# Patient Record
Sex: MALE | Race: WHITE | NOT HISPANIC OR LATINO | Employment: FULL TIME | URBAN - METROPOLITAN AREA
[De-identification: names, ages, dates, MRNs, and addresses within clinical notes are randomized per-mention and may not be internally consistent; named-entity substitution may affect disease eponyms.]

---

## 2017-01-07 RX ORDER — TIOTROPIUM BROMIDE 18 UG/1
CAPSULE ORAL; RESPIRATORY (INHALATION)
Refills: 6 | Status: CANCELLED | OUTPATIENT
Start: 2017-01-07

## 2017-01-11 NOTE — TELEPHONE ENCOUNTER
----- Message from Lola Saucedo sent at 1/11/2017 12:07 PM CST -----  Contact: [facility]  [phone]  markie  -  Pharmacy called requesting a refill on  tiotropium (SPIRIVA  - call back number [phone]  Thanks,

## 2017-01-17 RX ORDER — TIOTROPIUM BROMIDE 18 UG/1
18 CAPSULE ORAL; RESPIRATORY (INHALATION) DAILY
Qty: 30 CAPSULE | Refills: 6 | Status: SHIPPED | OUTPATIENT
Start: 2017-01-17 | End: 2017-08-09 | Stop reason: SDUPTHER

## 2017-02-15 ENCOUNTER — OFFICE VISIT (OUTPATIENT)
Dept: FAMILY MEDICINE | Facility: CLINIC | Age: 62
End: 2017-02-15
Payer: COMMERCIAL

## 2017-02-15 VITALS
OXYGEN SATURATION: 96 % | HEIGHT: 66 IN | SYSTOLIC BLOOD PRESSURE: 104 MMHG | WEIGHT: 215.63 LBS | HEART RATE: 90 BPM | DIASTOLIC BLOOD PRESSURE: 62 MMHG | BODY MASS INDEX: 34.65 KG/M2

## 2017-02-15 DIAGNOSIS — J44.9 COPD SUGGESTED BY INITIAL EVALUATION: ICD-10-CM

## 2017-02-15 DIAGNOSIS — F11.21 OPIOID TYPE DEPENDENCE IN REMISSION: ICD-10-CM

## 2017-02-15 DIAGNOSIS — F11.20 OPIOID DEPENDENCE ON AGONIST THERAPY: Primary | ICD-10-CM

## 2017-02-15 DIAGNOSIS — I10 ESSENTIAL HYPERTENSION: ICD-10-CM

## 2017-02-15 DIAGNOSIS — G47.33 OSA (OBSTRUCTIVE SLEEP APNEA): ICD-10-CM

## 2017-02-15 PROCEDURE — 99214 OFFICE O/P EST MOD 30 MIN: CPT | Mod: S$GLB,,,

## 2017-02-15 PROCEDURE — 3074F SYST BP LT 130 MM HG: CPT | Mod: S$GLB,,,

## 2017-02-15 PROCEDURE — 99999 PR PBB SHADOW E&M-EST. PATIENT-LVL III: CPT | Mod: PBBFAC,,,

## 2017-02-15 PROCEDURE — 3078F DIAST BP <80 MM HG: CPT | Mod: S$GLB,,,

## 2017-02-15 RX ORDER — BETAMETHASONE VALERATE 0.1 %
LOTION (ML) TOPICAL 2 TIMES DAILY
Qty: 60 ML | Refills: 11 | Status: SHIPPED | OUTPATIENT
Start: 2017-02-15 | End: 2018-07-16

## 2017-02-15 RX ORDER — PSEUDOEPHEDRINE HCL 120 MG/1
120 TABLET, FILM COATED, EXTENDED RELEASE ORAL
COMMUNITY
End: 2018-07-16

## 2017-02-15 NOTE — PATIENT INSTRUCTIONS
"You are being prescribed Buprenorphine/Naloxone for opioid dependency. Buprenorphine is an opioid medication. Buprenorphine is similar to other opioids such as morphine, codeine, and heroin however, it produces less euphoric ("high") effects and therefore may be easier to stop taking.  Naloxone blocks the effects of opioids such as morphine, codeine, and heroin. If buprenorphine and naloxone is injected, naloxone will block the effects of buprenorphine and lead to withdrawal symptoms in a person with opioid dependency. When administered under the tongue as directed, naloxone will not affect the actions of buprenorphine.    Buprenorphine and naloxone can cause drug dependence. This means that withdrawal symptoms may occur if you stop using the medicine too quickly. Withdrawal symptoms may also occur at the start of treatment due to dependence on another drug. Buprenorphine and naloxone is not for occasional ("as needed") use. Do not stop taking buprenorphine and naloxone without first talking to your doctor. Your doctor may want to gradually reduce the dose to avoid or minimize withdrawal symptoms.    Buprenorphine/Naloxone can only be prescribed by certain physicians who have had training in the field of addictive disorders and are certified by the Drug Enforcement Agency (JOSE ELIAS). Buprenorphine/Naloxone is a controlled substance and it's use is regulated by the [place] Board of Medical Examiners, [place] [employer] board and the JOSE [employer].     It is important that you are compliant in taking Buprenorphine/Naloxone strictly as prescribed. You have signed an agreement and am aware of the the risk of physical dependency, tolerance or addition to Buprenorphine/Naloxone. You have been informed of the side effects associated with this medication including constipation. You have been informed that certain medications in the benzodiazepine class including diazepam, lorazepam, alprazolam and similar medications " "should not be taken with Buprenorphine/Naloxone. You are aware that withdrawal symptoms including yawning, sweating, watery eyes, runny nose, anxiety, tremors, aching muscles, hot and cold flashes, goose bumps, abdominal pain, diarrhea and depression can occur if you suddenly discontinue or reduce the dose you are prescribed.     You have been informed that this medication should not be taken during pregnancy and you will inform me if you do plan to become or become pregnant during treatment.     You have agreed not to obtain this or other controlled medications from other physicians while taking Buprenorphine/Naloxone. You understand that I will review a history of all controlled medications prescribed to you on the [facility] Prescription Monitoring Program Database and that you must report any other controlled medication use to your physician. You will not take opioid pain medications while taking Buprenorphine/Naloxone. If you have an emergency or acute pain crises you will inform me as soon as possible.     You will come for your regularly scheduled appointments which must be at least every 90 days. You may schedule an appointment early and I can prescribe your medication in advance with a "do not fill until" date. If you have not been evaluated in the past 90 days your prescription will not be renewed. You should schedule an appointment for your return visit before you leave the office today because if I am out of the office for vacation or other reasons there is no other physician that can prescribe this medication in my absence. If you schedule an appointment and I need to cancel that visit my office will contact you to make alternative arrangements.     You should not call for refills of this medications at night, weekends or Holidays without exception.     You have agreed not to use illegal drugs while taking Buprenorphine/Naloxone including marijuana. You understand that I can request a random " drug test at any time and that you have agreed to provide this sample without advanced notice. Failure to comply with this request could result in termination of your treatment with Buprenorphine/Naloxone.     You understand that lost, stolen, misplaced, spilled medications will not be replaced. It is your responsibility to safeguard your medication. You understand that your medication cannot be filled early for any reason including work schedule conflicts, family emergencies, travel and vacations. You also have agreed not to give or sell Buprenorphine/Naloxone to anyone.     You have agreed to take this medication under your tongue properly.     If your insurance requires a prior authorization for this medication it could result in a delay of authorization for payment of your medication. I advise that you obtain information from your insurance and pharmacist to determine if you will require a prior authorization as far in advance as possible.     It is strongly recommended that you seek outpatient substance abuse treatment in addition to medical management with Buprenorphine/Naloxone. If your insurance doesn't provide this benefit or if you can't afford counseling you can seek treatment with your local AA/NA chapter or with your [facility]. Many insurances require that you are actively in outpatient treatment before they will authorize payment for Buprenorphine/Naloxone.

## 2017-02-15 NOTE — MR AVS SNAPSHOT
[facility]  [address] [place] [address]  Phone: [phone]  Fax: [phone]                  Donald Sharma   2/15/2017 1:20 PM   Office Visit    Description:  Male : 1955   Provider:  Galileo Fontenot Jr., MD   Department:  [facility]           Reason for Visit     Drug / Alcohol Assessment           Diagnoses this Visit        Comments    Opioid dependence on agonist therapy    -  Primary     EMILIANO (obstructive sleep apnea)         COPD suggested by initial evaluation         Essential hypertension         Opioid type dependence in remission                To Do List           Future Appointments        Provider Department Dept Phone    2017 3:00 PM Oneal Sun MD [facility] - Pulmonary Services [phone]      Goals (5 Years of Data)     None      Follow-Up and Disposition     Return in about 3 months (around 5/15/2017).    Follow-up and Disposition History       These Medications        Disp Refills Start End    betamethasone valerate 0.1% (VALISONE) 0.1 % Lotn 60 mL 11 2/15/2017 2017    Apply topically 2 (two) times daily. - Topical    Pharmacy: [facility]/pharmacy #[id number] - [place], [place] - [address] Ph #: [phone]       buprenorphine-naloxone (ZUBSOLV) 5.7-1.4 mg Subl 30 tablet 2 2/15/2017     Place 1 tablet under the tongue once daily. - Sublingual    Pharmacy: [facility]/pharmacy #[id number] - [place], [place] - [address] Ph #: [phone]         [facility]     [facility] On Call Nurse Care Line -  Assistance  Registered nurses in the [facility] provide clinical advisement, health education, appointment booking, and other advisory services.  Call for this free service at [phone].             Medications           Message regarding Medications     Verify the changes and/or additions to your medication regime listed below are the same as discussed with your clinician today.  If any of these changes or  "additions are incorrect, please notify your healthcare provider.        START taking these NEW medications        Refills    betamethasone valerate 0.1% (VALISONE) 0.1 % Lotn 11    Sig: Apply topically 2 (two) times daily.    Class: Normal    Route: Topical           Verify that the below list of medications is an accurate representation of the medications you are currently taking.  If none reported, the list may be blank. If incorrect, please contact your healthcare provider. Carry this list with you in case of emergency.           Current Medications     albuterol (VENTOLIN HFA) 90 mcg/actuation inhaler Inhale 2 puffs into the lungs every 6 (six) hours.    buprenorphine-naloxone (ZUBSOLV) 5.7-1.4 mg Subl Place 1 tablet under the tongue once daily.    buprenorphine-naloxone (ZUBSOLV) 5.7-1.4 mg Subl Place 1 tablet under the tongue once daily.    lisinopril (PRINIVIL,ZESTRIL) 40 MG tablet TAKE 1 TABLET (40 MG TOTAL) BY MOUTH ONCE DAILY.    pseudoephedrine (SUDAFED) 120 mg 12 hr tablet Take 120 mg by mouth every 12 (twelve) hours.    tamsulosin (FLOMAX) 0.4 mg Cp24 Take 1 capsule (0.4 mg total) by mouth once daily.    tiotropium (SPIRIVA WITH HANDIHALER) 18 mcg inhalation capsule Inhale 1 capsule (18 mcg total) into the lungs once daily.    betamethasone valerate 0.1% (VALISONE) 0.1 % Lotn Apply topically 2 (two) times daily.    ipratropium (ATROVENT) 0.06 % nasal spray 2 sprays by Nasal route 4 (four) times daily.           Clinical Reference Information           Your Vitals Were     BP Pulse Height Weight SpO2 BMI    104/62 (BP Location: Left arm, Patient Position: Sitting, BP Method: Manual) 90 5' 6" (1.676 m) 97.8 kg (215 lb 9.8 oz) 96% 34.8 kg/m2      Blood Pressure          Most Recent Value    BP  104/62      Allergies as of 2/15/2017     No Known Drug Allergies      Immunizations Administered on Date of Encounter - 2/15/2017     None      Instructions    You are being prescribed Buprenorphine/Naloxone for " "opioid dependency. Buprenorphine is an opioid medication. Buprenorphine is similar to other opioids such as morphine, codeine, and heroin however, it produces less euphoric ("high") effects and therefore may be easier to stop taking.  Naloxone blocks the effects of opioids such as morphine, codeine, and heroin. If buprenorphine and naloxone is injected, naloxone will block the effects of buprenorphine and lead to withdrawal symptoms in a person with opioid dependency. When administered under the tongue as directed, naloxone will not affect the actions of buprenorphine.    Buprenorphine and naloxone can cause drug dependence. This means that withdrawal symptoms may occur if you stop using the medicine too quickly. Withdrawal symptoms may also occur at the start of treatment due to dependence on another drug. Buprenorphine and naloxone is not for occasional ("as needed") use. Do not stop taking buprenorphine and naloxone without first talking to your doctor. Your doctor may want to gradually reduce the dose to avoid or minimize withdrawal symptoms.    Buprenorphine/Naloxone can only be prescribed by certain physicians who have had training in the field of addictive disorders and are certified by the Drug Enforcement Agency (JOSE ELI[employer]). Buprenorphine/Naloxone is a controlled substance and it's use is regulated by the [facility] Board of Medical Examiners, [place] [facility] board and the [employer].     It is important that you are compliant in taking Buprenorphine/Naloxone strictly as prescribed. You have signed an agreement and am aware of the the risk of physical dependency, tolerance or addition to Buprenorphine/Naloxone. You have been informed of the side effects associated with this medication including constipation. You have been informed that certain medications in the benzodiazepine class including diazepam, lorazepam, alprazolam and similar medications should not be taken with Buprenorphine/Naloxone. You are " "aware that withdrawal symptoms including yawning, sweating, watery eyes, runny nose, anxiety, tremors, aching muscles, hot and cold flashes, goose bumps, abdominal pain, diarrhea and depression can occur if you suddenly discontinue or reduce the dose you are prescribed.     You have been informed that this medication should not be taken during pregnancy and you will inform me if you do plan to become or become pregnant during treatment.     You have agreed not to obtain this or other controlled medications from other physicians while taking Buprenorphine/Naloxone. You understand that I will review a history of all controlled medications prescribed to you on the [facility] Prescription Monitoring Program Database and that you must report any other controlled medication use to your physician. You will not take opioid pain medications while taking Buprenorphine/Naloxone. If you have an emergency or acute pain crises you will inform me as soon as possible.     You will come for your regularly scheduled appointments which must be at least every 90 days. You may schedule an appointment early and I can prescribe your medication in advance with a "do not fill until" date. If you have not been evaluated in the past 90 days your prescription will not be renewed. You should schedule an appointment for your return visit before you leave the office today because if I am out of the office for vacation or other reasons there is no other physician that can prescribe this medication in my absence. If you schedule an appointment and I need to cancel that visit my office will contact you to make alternative arrangements.     You should not call for refills of this medications at night, weekends or Holidays without exception.     You have agreed not to use illegal drugs while taking Buprenorphine/Naloxone including marijuana. You understand that I can request a random drug test at any time and that you have agreed to provide " this sample without advanced notice. Failure to comply with this request could result in termination of your treatment with Buprenorphine/Naloxone.     You understand that lost, stolen, misplaced, spilled medications will not be replaced. It is your responsibility to safeguard your medication. You understand that your medication cannot be filled early for any reason including work schedule conflicts, family emergencies, travel and vacations. You also have agreed not to give or sell Buprenorphine/Naloxone to anyone.     You have agreed to take this medication under your tongue properly.     If your insurance requires a prior authorization for this medication it could result in a delay of authorization for payment of your medication. I advise that you obtain information from your insurance and pharmacist to determine if you will require a prior authorization as far in advance as possible.     It is strongly recommended that you seek outpatient substance abuse treatment in addition to medical management with Buprenorphine/Naloxone. If your insurance doesn't provide this benefit or if you can't afford counseling you can seek treatment with your local AA/NA chapter or with your [facility]. Many insurances require that you are actively in outpatient treatment before they will authorize payment for Buprenorphine/Naloxone.        Smoking Cessation     If you would like to quit smoking:   You may be eligible for free services if you are a [place] resident and started smoking cigarettes before September 1, 1988.  Call the Smoking Cessation Trust (SCT) toll free at (058) 456-6753 or (425) 285-0237.   Call 6-331-QUIT-NOW if you do not meet the above criteria.            Language Assistance Services     ATTENTION: Language assistance services are available, free of charge. Please call [phone].      ATENCIÓN: Si habla español, tiene a sahu disposición servicios gratuitos de asistencia  lingüística. Enrique al [phone].     LUZ Ý: N?u b?n nói Ti?ng Vi?t, có các d?ch v? h? tr? ngôn ng? mi?n phí dành cho b?n. G?i s? [phone].         [facility] complies with applicable Federal civil rights laws and does not discriminate on the basis of race, color, national origin, age, disability, or sex.

## 2017-02-15 NOTE — PROGRESS NOTES
Subjective:       Patient ID: Donald Sharma is a 61 y.o. male.    Chief Complaint: Drug / Alcohol Assessment    HPI  The patient presents for medical management of opioid dependency. he is receiving maintenance therapy with Zubsolv. he is doing well and denies any craving or relapses. he denies any alcohol or substance abuse issues. he has had significant improvements in his relationships, social and occupational functioning. he claims to be compliant with treatment. I have reviewed his profile on the [place] board [place] Pharm[place] Prescription monitoring program website and he appears to be compliant. his last refill for Zubsolv 5.7 mg # 30 was on January 13, 2017. The patient understands the risks, benefits and alternatives associated with the medical management of opioid dependency with Suboxone and has signed a Behavior and Pain Agreement for the use of Controlled Drugs.        The patient presents for medical management of his chronic medical problems including EMILIANO, COPD and hypertension. He is compliant with his medications and CPAP. He is scheduled to consult with his pulmonologist. He is refusing to take an influenza vaccination.     Review of Systems   Constitutional: Negative.    Respiratory: Negative.  Negative for shortness of breath.    Cardiovascular: Negative for chest pain.   Skin: Positive for rash.        Itch skin on legs    Psychiatric/Behavioral: Negative.    All other systems reviewed and are negative.      Objective:      Vitals:    02/15/17 1318   BP: 104/62   Pulse: 90     Physical Exam   Constitutional: He is oriented to person, place, and time. He appears well-developed and well-nourished. He is cooperative. No distress.   HENT:   Head: Normocephalic and atraumatic.   Right Ear: Hearing, tympanic membrane, external ear and ear canal normal.   Left Ear: Hearing, external ear and ear canal normal.   Nose: Nose normal.   Mouth/Throat: Oropharynx is clear and moist.   Eyes:  Conjunctivae are normal. Pupils are equal, round, and reactive to light.   Neck: Normal range of motion. Neck supple. No thyromegaly present.   Cardiovascular: Normal rate, regular rhythm, normal heart sounds and intact distal pulses.    Pulmonary/Chest: Effort normal and breath sounds normal. No respiratory distress.   Musculoskeletal: Normal range of motion. He exhibits no edema or tenderness.   Lymphadenopathy:     He has no cervical adenopathy.   Neurological: He is alert and oriented to person, place, and time. He has normal strength. Coordination and gait normal.   Skin: Skin is warm, dry and intact. No cyanosis. Nails show no clubbing.   Psychiatric: He has a normal mood and affect. His speech is normal and behavior is normal. Judgment and thought content normal. Cognition and memory are normal.   Vitals reviewed.            Assessment:       1. Opioid dependence on agonist therapy    2. EMILIANO (obstructive sleep apnea)    3. COPD suggested by initial evaluation    4. Essential hypertension    5. Opioid type dependence in remission        Plan:       Opioid dependence on agonist therapy    EMILIANO (obstructive sleep apnea)    COPD suggested by initial evaluation    Essential hypertension    Opioid type dependence in remission  -     buprenorphine-naloxone (ZUBSOLV) 5.7-1.4 mg Subl; Place 1 tablet under the tongue once daily.  Dispense: 30 tablet; Refill: 2    Other orders  -     betamethasone valerate 0.1% (VALISONE) 0.1 % Lotn; Apply topically 2 (two) times daily.  Dispense: 60 mL; Refill: 11      Return in about 3 months (around 5/15/2017).

## 2017-02-17 ENCOUNTER — OFFICE VISIT (OUTPATIENT)
Dept: PULMONOLOGY | Facility: CLINIC | Age: 62
End: 2017-02-17
Payer: COMMERCIAL

## 2017-02-17 ENCOUNTER — HOSPITAL ENCOUNTER (OUTPATIENT)
Dept: RADIOLOGY | Facility: HOSPITAL | Age: 62
Discharge: HOME | End: 2017-02-17
Attending: INTERNAL MEDICINE

## 2017-02-17 VITALS
SYSTOLIC BLOOD PRESSURE: 118 MMHG | HEART RATE: 89 BPM | OXYGEN SATURATION: 96 % | HEIGHT: 67 IN | DIASTOLIC BLOOD PRESSURE: 76 MMHG | WEIGHT: 218.25 LBS | BODY MASS INDEX: 34.26 KG/M2

## 2017-02-17 DIAGNOSIS — G47.33 OSA (OBSTRUCTIVE SLEEP APNEA): ICD-10-CM

## 2017-02-17 DIAGNOSIS — J43.1 PANLOBULAR EMPHYSEMA: Primary | ICD-10-CM

## 2017-02-17 DIAGNOSIS — F17.200 TOBACCO DEPENDENCE SYNDROME: ICD-10-CM

## 2017-02-17 DIAGNOSIS — J43.1 PANLOBULAR EMPHYSEMA: ICD-10-CM

## 2017-02-17 PROCEDURE — 99999 PR PBB SHADOW E&M-EST. PATIENT-LVL IV: CPT | Mod: PBBFAC,,, | Performed by: INTERNAL MEDICINE

## 2017-02-17 PROCEDURE — 99214 OFFICE O/P EST MOD 30 MIN: CPT | Mod: S$GLB,,, | Performed by: INTERNAL MEDICINE

## 2017-02-17 PROCEDURE — 3074F SYST BP LT 130 MM HG: CPT | Mod: S$GLB,,, | Performed by: INTERNAL MEDICINE

## 2017-02-17 PROCEDURE — 1160F RVW MEDS BY RX/DR IN RCRD: CPT | Mod: S$GLB,,, | Performed by: INTERNAL MEDICINE

## 2017-02-17 PROCEDURE — 76497 UNLISTED CT PROCEDURE: CPT | Mod: TC

## 2017-02-17 PROCEDURE — 3078F DIAST BP <80 MM HG: CPT | Mod: S$GLB,,, | Performed by: INTERNAL MEDICINE

## 2017-02-17 NOTE — PROGRESS NOTES
Subjective:       Patient ID: Donald Sharma is a 61 y.o. male.    Chief Complaint: COPD    HPI Comments: Mr. Sharma is followed in pulmonary clinic for moderately severe COPD.  He has noted some improvement in his breathing since beginning Spiriva.  He uses albuterol 4-5 times per week and continues to work as a .  He has decreased his cigarette use to 1/2 pack per day.  He continues to use nicotine gum to help curb his cravings.  He has had no hospital admissions since our last visit.  Unfortunately, he did not get in to see Dr. Chao for CPAP titration.    Review of Systems   Constitutional: Negative for fever, chills, activity change and fatigue.   HENT: Negative for postnasal drip, sinus pressure and sore throat.    Eyes: Negative for redness and itching.   Respiratory: Negative for cough, sputum production, chest tightness, shortness of breath, wheezing and dyspnea on extertion.    Cardiovascular: Negative for chest pain, palpitations and leg swelling.   Musculoskeletal: Negative for arthralgias and myalgias.   Skin: Negative for rash.   Gastrointestinal: Negative for nausea, vomiting and abdominal pain.   Neurological: Negative for dizziness and headaches.   Psychiatric/Behavioral: Negative for confusion.       Objective:      Physical Exam   Constitutional: He is oriented to person, place, and time. He appears well-developed and well-nourished.   HENT:   Head: Normocephalic.   Nose: Nose normal.   Mouth/Throat: Oropharynx is clear and moist. Mallampati Score: III.   Neck: Normal range of motion. Neck supple.   Cardiovascular: Normal rate, regular rhythm, normal heart sounds and intact distal pulses.    Pulmonary/Chest: Normal expansion, symmetric chest wall expansion and breath sounds normal. No respiratory distress. He has no decreased breath sounds. He has no wheezes. He has no rhonchi. He has no rales.   Abdominal: Soft. Bowel sounds are normal.   Musculoskeletal: Normal range of  motion. He exhibits no edema.   Neurological: He is alert and oriented to person, place, and time. Gait normal.   Skin: Skin is warm. No rash noted.   Psychiatric: He has a normal mood and affect. His behavior is normal. Judgment and thought content normal.   Nursing note and vitals reviewed.    Personal Diagnostic Review  none pertinent  Pulmonary Function Tests 2/17/2017   SpO2 96   Height 67.000   Weight 3492.09   BMI (Calculated) 34.3         Assessment:       1. Panlobular emphysema    2. EMILIANO (obstructive sleep apnea)    3. Tobacco dependence syndrome        Outpatient Encounter Prescriptions as of 2/17/2017   Medication Sig Dispense Refill    albuterol (VENTOLIN HFA) 90 mcg/actuation inhaler Inhale 2 puffs into the lungs every 6 (six) hours. 1 Inhaler 11    betamethasone valerate 0.1% (VALISONE) 0.1 % Lotn Apply topically 2 (two) times daily. 60 mL 11    buprenorphine-naloxone (ZUBSOLV) 5.7-1.4 mg Subl Place 1 tablet under the tongue once daily. 30 tablet 2    lisinopril (PRINIVIL,ZESTRIL) 40 MG tablet TAKE 1 TABLET (40 MG TOTAL) BY MOUTH ONCE DAILY. 30 tablet 11    pseudoephedrine (SUDAFED) 120 mg 12 hr tablet Take 120 mg by mouth every 12 (twelve) hours.      tamsulosin (FLOMAX) 0.4 mg Cp24 Take 1 capsule (0.4 mg total) by mouth once daily. 30 capsule 11    tiotropium (SPIRIVA WITH HANDIHALER) 18 mcg inhalation capsule Inhale 1 capsule (18 mcg total) into the lungs once daily. 30 capsule 6    [DISCONTINUED] buprenorphine-naloxone (ZUBSOLV) 5.7-1.4 mg Subl Place 1 tablet under the tongue once daily. 30 tablet 2    [DISCONTINUED] ipratropium (ATROVENT) 0.06 % nasal spray 2 sprays by Nasal route 4 (four) times daily. 15 mL 12     No facility-administered encounter medications on file as of 2/17/2017.      Orders Placed This Encounter   Procedures    CT Chest Lung Screening Low Dose     Standing Status:   Future     Standing Expiration Date:   2/17/2018     Order Specific Question:   Are you a current  or former smoker who quit within the past 15 years?     Answer:   Yes     Order Specific Question:   Are you between age 55-77 years old?     Answer:   Yes     Order Specific Question:   Never had cancer or an abnormal Chest CT     Answer:   Yes     Order Specific Question:   May the Radiologist modify the order per protocol to meet the clinical needs of the patient?     Answer:   Yes    Ambulatory consult to Sleep Disorders     Referral Priority:   Routine     Referral Type:   Consultation     Referred to Provider:   Albaro Chao MD     Number of Visits Requested:   1     Plan:       1) Low-dose CT of chest to screen for lung cancer  2) Continue Spiriva     A) Albuterol as needed  3) Refer to Dr. Chao for CPAP titration study  4) Discussed use of nicotine replacement gum and use of electronic cigarettes as quitting strategies     A) Unable to use Chantix due to restrictions at work  5) Follow up 6-8 months    Oneal Sun MD  [facility] Pulmonary/CC Service, PGY VI  [phone]

## 2017-02-17 NOTE — PATIENT INSTRUCTIONS
1) Low-dose CT scan of chest to screen for lung cancer  2) Continue Spiriva     A) Albuterol as needed  3) Consider using electronic cigarettes instead of conventional cigarettes     A) Keep trying to stop smoking  4) Referral submitted to Dr. Chao for CPAP titration study  5) Return to clinic 6-8 months

## 2017-02-17 NOTE — MR AVS SNAPSHOT
[facility] - Pulmonary Services  [address]  [facility] [address]  Phone: [phone]                  Donald Sharma   2017 3:00 PM   Office Visit    Description:  Male : 1955   Provider:  Oneal Sun MD   Department:  [facility]           Reason for Visit     COPD           Diagnoses this Visit        Comments    Panlobular emphysema    -  Primary     EMILIANO (obstructive sleep apnea)         Tobacco dependence syndrome                To Do List           Goals (5 Years of Data)     None      Follow-Up and Disposition     Return in about 6 months (around 2017).      [facility]     [facility] -  Assistance  Registered nurses in the [facility] provide clinical advisement, health education, appointment booking, and other advisory services.  Call for this free service at [phone].             Medications           Message regarding Medications     Verify the changes and/or additions to your medication regime listed below are the same as discussed with your clinician today.  If any of these changes or additions are incorrect, please notify your healthcare provider.        STOP taking these medications     ipratropium (ATROVENT) 0.06 % nasal spray 2 sprays by Nasal route 4 (four) times daily.           Verify that the below list of medications is an accurate representation of the medications you are currently taking.  If none reported, the list may be blank. If incorrect, please contact your healthcare provider. Carry this list with you in case of emergency.           Current Medications     albuterol (VENTOLIN HFA) 90 mcg/actuation inhaler Inhale 2 puffs into the lungs every 6 (six) hours.    betamethasone valerate 0.1% (VALISONE) 0.1 % Lotn Apply topically 2 (two) times daily.    buprenorphine-naloxone (ZUBSOLV) 5.7-1.4 mg Subl Place 1 tablet under the tongue once daily.    lisinopril (PRINIVIL,ZESTRIL) 40  "MG tablet TAKE 1 TABLET (40 MG TOTAL) BY MOUTH ONCE DAILY.    pseudoephedrine (SUDAFED) 120 mg 12 hr tablet Take 120 mg by mouth every 12 (twelve) hours.    tamsulosin (FLOMAX) 0.4 mg Cp24 Take 1 capsule (0.4 mg total) by mouth once daily.    tiotropium (SPIRIVA WITH HANDIHALER) 18 mcg inhalation capsule Inhale 1 capsule (18 mcg total) into the lungs once daily.           Clinical Reference Information           Your Vitals Were     BP Pulse Height Weight SpO2 BMI    118/76 89 5' 7" (1.702 m) 99 kg (218 lb 4.1 oz) 96% 34.18 kg/m2      Blood Pressure          Most Recent Value    BP  118/76      Allergies as of 2/17/2017     No Known Drug Allergies      Immunizations Administered on Date of Encounter - 2/17/2017     None      Orders Placed During Today's Visit      Normal Orders This Visit    Ambulatory consult to Sleep Disorders     Future Labs/Procedures Expected by Expires    CT Chest Lung Screening Low Dose  2/17/2017 2/17/2018      Instructions    1) Low-dose CT scan of chest to screen for lung cancer  2) Continue Spiriva     A) Albuterol as needed  3) Consider using electronic cigarettes instead of conventional cigarettes     A) Keep trying to stop smoking  4) Referral submitted to Dr. Chao for CPAP titration study  5) Return to clinic 6-8 months       Smoking Cessation     If you would like to quit smoking:   You may be eligible for free services if you are a [place] resident and started smoking cigarettes before September 1, 1988.  Call the Smoking Cessation Trust ([facility]) toll free at (157) [phone] or ([phone] [phone].   Call 1-800-QUIT-NOW if you do not meet the above criteria.            Language Assistance Services     ATTENTION: Language assistance services are available, free of charge. Please call [phone].      ATENCIÓN: Si habla español, tiene a sahu disposición servicios gratuitos de asistencia lingüística. Llame al [phone].     CHÚ Ý: N?u b?n nói Ti?ng Vi?t, có các d?ch v? h? tr? ngôn " ng? mi?n phí dành cho b?n. G?i s? [phone].         [facility] complies with applicable Federal civil rights laws and does not discriminate on the basis of race, color, national origin, age, disability, or sex.

## 2017-02-22 ENCOUNTER — TELEPHONE (OUTPATIENT)
Dept: PULMONOLOGY | Facility: CLINIC | Age: 62
End: 2017-02-22

## 2017-02-22 NOTE — PROGRESS NOTES
I have reviewed and concur with the resident's history, physical, assessment, and plan.  I have personally interviewed and examined the patient at bedside.  See below addendum for my evaluation and additional findings.

## 2017-02-22 NOTE — TELEPHONE ENCOUNTER
----- Message from Albaro Chao MD sent at 2/22/2017  4:24 PM CST -----  Please schedule clinic appointment with me or one of the sleep provider.  Thanks.    ----- Message -----     From: Oneal Sun MD     Sent: 2/22/2017   8:04 AM       To: Albaro Chao MD    I put the consult request in last week.  Is it not showing up on your end?    ----- Message -----     From: Albaro Chao MD     Sent: 2/20/2017   9:15 AM       To: MD Oneal Garcia,   Do you want us to see the patient?  If so, please put in sleep consult request.  Otherwise for work clearance, you will need to document cpap compliance.  albaro  ----- Message -----     From: Krystal Graff     Sent: 2/17/2017   4:33 PM       To: Albaro Chao MD    Pt schedule   ----- Message -----     From: Oneal Sun MD     Sent: 2/17/2017   3:18 PM       To: Krystal Graff    I placed a referral for this patient to get a CPAP titration study.  He currently uses his machine but has not had adjustments in over a year.  His work as a  requires documentation of follow-up and titration.  Thanks for taking a look!    Oneal

## 2017-02-24 DIAGNOSIS — R91.1 PULMONARY NODULE: Primary | ICD-10-CM

## 2017-05-09 DIAGNOSIS — N40.1 BENIGN NON-NODULAR PROSTATIC HYPERPLASIA WITH LOWER URINARY TRACT SYMPTOMS: ICD-10-CM

## 2017-05-09 RX ORDER — TAMSULOSIN HYDROCHLORIDE 0.4 MG/1
CAPSULE ORAL
Qty: 30 CAPSULE | Refills: 11 | Status: SHIPPED | OUTPATIENT
Start: 2017-05-09 | End: 2018-07-16

## 2017-05-09 RX ORDER — LISINOPRIL 40 MG/1
TABLET ORAL
Qty: 30 TABLET | Refills: 11 | Status: SHIPPED | OUTPATIENT
Start: 2017-05-09 | End: 2018-07-16 | Stop reason: SDUPTHER

## 2017-05-15 DIAGNOSIS — F11.21 OPIOID TYPE DEPENDENCE IN REMISSION: ICD-10-CM

## 2017-05-15 RX ORDER — BUPRENORPHINE HYDROCHLORIDE AND NALOXONE HYDROCHLORIDE 5.7; 1.4 MG/1; MG/1
TABLET, ORALLY DISINTEGRATING SUBLINGUAL
Qty: 30 TABLET | Refills: 0 | Status: SHIPPED | OUTPATIENT
Start: 2017-05-15 | End: 2017-06-20 | Stop reason: SDUPTHER

## 2017-05-17 ENCOUNTER — TELEPHONE (OUTPATIENT)
Dept: FAMILY MEDICINE | Facility: CLINIC | Age: 62
End: 2017-05-17

## 2017-05-17 NOTE — TELEPHONE ENCOUNTER
----- Message from Ember Gardner sent at 5/17/2017 12:20 PM CDT -----  Calling for refill of    subzol---suboxone      Pharmacy   [facility] in [place]    Reach pt at  [phone]

## 2017-06-20 DIAGNOSIS — Z11.59 NEED FOR HEPATITIS C SCREENING TEST: Primary | ICD-10-CM

## 2017-06-20 DIAGNOSIS — F11.21 OPIOID TYPE DEPENDENCE IN REMISSION: ICD-10-CM

## 2017-06-20 RX ORDER — BUPRENORPHINE HYDROCHLORIDE AND NALOXONE HYDROCHLORIDE 5.7; 1.4 MG/1; MG/1
TABLET, ORALLY DISINTEGRATING SUBLINGUAL
Qty: 30 TABLET | Refills: 0 | Status: SHIPPED | OUTPATIENT
Start: 2017-06-20 | End: 2017-06-21 | Stop reason: SDUPTHER

## 2017-06-21 ENCOUNTER — OFFICE VISIT (OUTPATIENT)
Dept: FAMILY MEDICINE | Facility: CLINIC | Age: 62
End: 2017-06-21
Payer: COMMERCIAL

## 2017-06-21 VITALS
DIASTOLIC BLOOD PRESSURE: 80 MMHG | OXYGEN SATURATION: 98 % | HEIGHT: 67 IN | BODY MASS INDEX: 31.63 KG/M2 | SYSTOLIC BLOOD PRESSURE: 110 MMHG | HEART RATE: 80 BPM | WEIGHT: 201.5 LBS

## 2017-06-21 DIAGNOSIS — R91.1 LUNG NODULE: ICD-10-CM

## 2017-06-21 DIAGNOSIS — J44.9 CHRONIC OBSTRUCTIVE PULMONARY DISEASE, UNSPECIFIED COPD TYPE: ICD-10-CM

## 2017-06-21 DIAGNOSIS — F11.20 OPIOID DEPENDENCE ON AGONIST THERAPY: Primary | ICD-10-CM

## 2017-06-21 DIAGNOSIS — F11.21 OPIOID TYPE DEPENDENCE IN REMISSION: ICD-10-CM

## 2017-06-21 DIAGNOSIS — G47.33 OSA (OBSTRUCTIVE SLEEP APNEA): ICD-10-CM

## 2017-06-21 PROCEDURE — 99214 OFFICE O/P EST MOD 30 MIN: CPT | Mod: S$GLB,,,

## 2017-06-21 PROCEDURE — 99999 PR PBB SHADOW E&M-EST. PATIENT-LVL III: CPT | Mod: PBBFAC,,,

## 2017-06-21 NOTE — PROGRESS NOTES
Subjective:       Patient ID: Donald Sharma is a 61 y.o. male.    Chief Complaint: Drug / Alcohol Assessment    HPI The patient presents for medical management of opioid dependency. he is receiving maintenance therapy with Suboxone. he is doing well and denies any craving or relapses. he denies any alcohol or substance abuse issues. he has had significant improvements in his relationships, social and occupational functioning. he claims to be compliant with treatment. I have reviewed his profile on the [facility] board of Pharmacy Prescription monitoring program website and he appears to be compliant. his last refill for Zubsolv 5.7 mg # 30 was on May 20, 2017. The patient understands the risks, benefits and alternatives associated with the medical management of opioid dependency with Suboxone and has signed a Behavior and Pain Agreement for the use of Controlled Drugs.         He is breathing better since he quit cigarettes and starting vaping. He plans to gradually reduce the nicotine percentage.     Review of Systems   Constitutional: Negative for activity change and unexpected weight change.   HENT: Negative for hearing loss, rhinorrhea and trouble swallowing.    Eyes: Negative for discharge and visual disturbance.   Respiratory: Positive for wheezing. Negative for chest tightness.    Cardiovascular: Negative for chest pain and palpitations.   Gastrointestinal: Negative for blood in stool, constipation, diarrhea and vomiting.   Endocrine: Negative for polydipsia and polyuria.   Genitourinary: Positive for urgency. Negative for difficulty urinating and hematuria.   Musculoskeletal: Negative for arthralgias, joint swelling and neck pain.   Neurological: Negative for weakness and headaches.   Psychiatric/Behavioral: Negative for confusion and dysphoric mood.       Objective:      Vitals:    06/21/17 1305   BP: 110/80   Pulse: 80     Physical Exam   Constitutional: He is oriented to person, place, and time.  He appears well-developed and well-nourished. He is cooperative. No distress.   HENT:   Head: Normocephalic and atraumatic.   Right Ear: Hearing, tympanic membrane, external ear and ear canal normal.   Left Ear: Hearing, external ear and ear canal normal.   Nose: Nose normal.   Mouth/Throat: Oropharynx is clear and moist.   Eyes: Conjunctivae are normal. Pupils are equal, round, and reactive to light.   Neck: Normal range of motion. Neck supple. No thyromegaly present.   Cardiovascular: Normal rate, regular rhythm, normal heart sounds and intact distal pulses.    Pulmonary/Chest: Effort normal and breath sounds normal. No respiratory distress.   Musculoskeletal: Normal range of motion. He exhibits no edema or tenderness.   Lymphadenopathy:     He has no cervical adenopathy.   Neurological: He is alert and oriented to person, place, and time. He has normal strength. Coordination and gait normal.   Skin: Skin is warm, dry and intact. No cyanosis. Nails show no clubbing.   Psychiatric: He has a normal mood and affect. His speech is normal and behavior is normal. Judgment and thought content normal. Cognition and memory are normal.   Vitals reviewed.      Assessment:       1. Opioid dependence on agonist therapy    2. Lung nodule    3. Opioid type dependence in remission    4. Chronic obstructive pulmonary disease, unspecified COPD type    5. EMILIANO (obstructive sleep apnea)        Plan:       Opioid dependence on agonist therapy    Lung nodule    Opioid type dependence in remission  -     buprenorphine-naloxone (ZUBSOLV) 5.7-1.4 mg Subl; Place 1 tablet under the tongue once daily at 6am.  Dispense: 30 tablet; Refill: 2    Chronic obstructive pulmonary disease, unspecified COPD type    EMILIANO (obstructive sleep apnea)      Return in about 3 months (around 9/21/2017).

## 2017-06-21 NOTE — PATIENT INSTRUCTIONS
"You are being prescribed Buprenorphine/Naloxone for opioid dependency. Buprenorphine is an opioid medication. Buprenorphine is similar to other opioids such as morphine, codeine, and heroin however, it produces less euphoric ("high") effects and therefore may be easier to stop taking.  Naloxone blocks the effects of opioids such as morphine, codeine, and heroin. If buprenorphine and naloxone is injected, naloxone will block the effects of buprenorphine and lead to withdrawal symptoms in a person with opioid dependency. When administered under the tongue as directed, naloxone will not affect the actions of buprenorphine.    Buprenorphine and naloxone can cause drug dependence. This means that withdrawal symptoms may occur if you stop using the medicine too quickly. Withdrawal symptoms may also occur at the start of treatment due to dependence on another drug. Buprenorphine and naloxone is not for occasional ("as needed") use. Do not stop taking buprenorphine and naloxone without first talking to your doctor. Your doctor may want to gradually reduce the dose to avoid or minimize withdrawal symptoms.    Buprenorphine/Naloxone can only be prescribed by certain physicians who have had training in the field of addictive disorders and are certified by the Drug Enforcement Agency ([employer]). Buprenorphine/Naloxone is a controlled substance and it's use is regulated by the [place] Board of Medical Examiners, [place] [facility] board and the [employer].     It is important that you are compliant in taking Buprenorphine/Naloxone strictly as prescribed. You have signed an agreement and am aware of the the risk of physical dependency, tolerance or addition to Buprenorphine/Naloxone. You have been informed of the side effects associated with this medication including constipation. You have been informed that certain medications in the benzodiazepine class including diazepam, lorazepam, alprazolam and similar medications " "should not be taken with Buprenorphine/Naloxone. You are aware that withdrawal symptoms including yawning, sweating, watery eyes, runny nose, anxiety, tremors, aching muscles, hot and cold flashes, goose bumps, abdominal pain, diarrhea and depression can occur if you suddenly discontinue or reduce the dose you are prescribed.     You have been informed that this medication should not be taken during pregnancy and you will inform me if you do plan to become or become pregnant during treatment.     You have agreed not to obtain this or other controlled medications from other physicians while taking Buprenorphine/Naloxone. You understand that I will review a history of all controlled medications prescribed to you on the [facility] Prescription Monitoring Program Database and that you must report any other controlled medication use to your physician. You will not take opioid pain medications while taking Buprenorphine/Naloxone. If you have an emergency or acute pain crises you will inform me as soon as possible.     You will come for your regularly scheduled appointments which must be at least every 90 days. You may schedule an appointment early and I can prescribe your medication in advance with a "do not fill until" date. If you have not been evaluated in the past 90 days your prescription will not be renewed. You should schedule an appointment for your return visit before you leave the office today because if I am out of the office for vacation or other reasons there is no other physician that can prescribe this medication in my absence. If you schedule an appointment and I need to cancel that visit my office will contact you to make alternative arrangements.     You should not call for refills of this medications at night, weekends or Holidays without exception.     You have agreed not to use illegal drugs while taking Buprenorphine/Naloxone including marijuana. You understand that I can request a random " drug test at any time and that you have agreed to provide this sample without advanced notice. Failure to comply with this request could result in termination of your treatment with Buprenorphine/Naloxone.     You understand that lost, stolen, misplaced, spilled medications will not be replaced. It is your responsibility to safeguard your medication. You understand that your medication cannot be filled early for any reason including work schedule conflicts, family emergencies, travel and vacations. You also have agreed not to give or sell Buprenorphine/Naloxone to anyone.     You have agreed to take this medication under your tongue properly.     If your insurance requires a prior authorization for this medication it could result in a delay of authorization for payment of your medication. I advise that you obtain information from your insurance and pharmacist to determine if you will require a prior authorization as far in advance as possible.     It is strongly recommended that you seek outpatient substance abuse treatment in addition to medical management with Buprenorphine/Naloxone. If your insurance doesn't provide this benefit or if you can't afford counseling you can seek treatment with your local AA/NA chapter or with your [facility]. Many insurances require that you are actively in outpatient treatment before they will authorize payment for Buprenorphine/Naloxone.

## 2017-08-07 RX ORDER — TIOTROPIUM BROMIDE 18 UG/1
CAPSULE ORAL; RESPIRATORY (INHALATION)
Refills: 6 | OUTPATIENT
Start: 2017-08-07

## 2017-08-09 DIAGNOSIS — J44.9 CHRONIC OBSTRUCTIVE PULMONARY DISEASE, UNSPECIFIED COPD TYPE: Primary | ICD-10-CM

## 2017-08-10 RX ORDER — TIOTROPIUM BROMIDE 18 UG/1
18 CAPSULE ORAL; RESPIRATORY (INHALATION) DAILY
Qty: 30 CAPSULE | Refills: 11 | Status: SHIPPED | OUTPATIENT
Start: 2017-08-10 | End: 2018-08-21 | Stop reason: SDUPTHER

## 2017-09-18 DIAGNOSIS — F11.21 OPIOID TYPE DEPENDENCE IN REMISSION: ICD-10-CM

## 2017-09-18 RX ORDER — BUPRENORPHINE HYDROCHLORIDE AND NALOXONE HYDROCHLORIDE 5.7; 1.4 MG/1; MG/1
TABLET, ORALLY DISINTEGRATING SUBLINGUAL
Qty: 30 TABLET | Refills: 0 | Status: SHIPPED | OUTPATIENT
Start: 2017-09-18 | End: 2017-09-21 | Stop reason: SDUPTHER

## 2017-09-20 DIAGNOSIS — F11.21 OPIOID TYPE DEPENDENCE IN REMISSION: ICD-10-CM

## 2017-09-21 ENCOUNTER — TELEPHONE (OUTPATIENT)
Dept: FAMILY MEDICINE | Facility: CLINIC | Age: 62
End: 2017-09-21

## 2017-09-21 DIAGNOSIS — F11.21 OPIOID TYPE DEPENDENCE IN REMISSION: ICD-10-CM

## 2017-09-21 RX ORDER — BUPRENORPHINE HYDROCHLORIDE AND NALOXONE HYDROCHLORIDE 5.7; 1.4 MG/1; MG/1
TABLET, ORALLY DISINTEGRATING SUBLINGUAL
Qty: 30 TABLET | Refills: 2 | OUTPATIENT
Start: 2017-09-21

## 2017-09-21 NOTE — TELEPHONE ENCOUNTER
----- Message from Pita Braun sent at 9/21/2017  1:51 PM CDT -----  Contact: pt [phone]  Pt would like to have is Suboxeone send to [facility] in [place].

## 2017-09-22 RX ORDER — BUPRENORPHINE HYDROCHLORIDE AND NALOXONE HYDROCHLORIDE 5.7; 1.4 MG/1; MG/1
TABLET, ORALLY DISINTEGRATING SUBLINGUAL
Qty: 30 TABLET | Refills: 0 | Status: SHIPPED | OUTPATIENT
Start: 2017-09-22 | End: 2017-09-27 | Stop reason: SDUPTHER

## 2017-09-27 ENCOUNTER — OFFICE VISIT (OUTPATIENT)
Dept: FAMILY MEDICINE | Facility: CLINIC | Age: 62
End: 2017-09-27
Payer: COMMERCIAL

## 2017-09-27 ENCOUNTER — DOCUMENTATION ONLY (OUTPATIENT)
Dept: FAMILY MEDICINE | Facility: CLINIC | Age: 62
End: 2017-09-27

## 2017-09-27 VITALS
OXYGEN SATURATION: 98 % | BODY MASS INDEX: 35.27 KG/M2 | SYSTOLIC BLOOD PRESSURE: 110 MMHG | DIASTOLIC BLOOD PRESSURE: 62 MMHG | HEIGHT: 66 IN | HEART RATE: 70 BPM | WEIGHT: 219.5 LBS

## 2017-09-27 DIAGNOSIS — F11.20 OPIOID DEPENDENCE ON AGONIST THERAPY: Primary | ICD-10-CM

## 2017-09-27 DIAGNOSIS — F11.21 OPIOID TYPE DEPENDENCE IN REMISSION: ICD-10-CM

## 2017-09-27 DIAGNOSIS — Z12.11 COLON CANCER SCREENING: ICD-10-CM

## 2017-09-27 PROCEDURE — 80348 DRUG SCREENING BUPRENORPHINE: CPT

## 2017-09-27 PROCEDURE — 99999 PR PBB SHADOW E&M-EST. PATIENT-LVL III: CPT | Mod: PBBFAC,,,

## 2017-09-27 PROCEDURE — 80307 DRUG TEST PRSMV CHEM ANLYZR: CPT

## 2017-09-27 PROCEDURE — 99214 OFFICE O/P EST MOD 30 MIN: CPT | Mod: S$GLB,,,

## 2017-09-27 PROCEDURE — 3008F BODY MASS INDEX DOCD: CPT | Mod: S$GLB,,,

## 2017-09-27 RX ORDER — CHLORHEXIDINE GLUCONATE ORAL RINSE 1.2 MG/ML
SOLUTION DENTAL
COMMUNITY
Start: 2017-09-25 | End: 2018-07-16

## 2017-09-27 RX ORDER — IBUPROFEN 800 MG/1
TABLET ORAL
COMMUNITY
Start: 2017-09-25 | End: 2018-01-29

## 2017-09-27 NOTE — PATIENT INSTRUCTIONS
"You are being prescribed Buprenorphine/Naloxone for opioid dependency. Buprenorphine is an opioid medication. Buprenorphine is similar to other opioids such as morphine, codeine, and heroin however, it produces less euphoric ("high") effects and therefore may be easier to stop taking.  Naloxone blocks the effects of opioids such as morphine, codeine, and heroin. If buprenorphine and naloxone is injected, naloxone will block the effects of buprenorphine and lead to withdrawal symptoms in a person with opioid dependency. When administered under the tongue as directed, naloxone will not affect the actions of buprenorphine.    Buprenorphine and naloxone can cause drug dependence. This means that withdrawal symptoms may occur if you stop using the medicine too quickly. Withdrawal symptoms may also occur at the start of treatment due to dependence on another drug. Buprenorphine and naloxone is not for occasional ("as needed") use. Do not stop taking buprenorphine and naloxone without first talking to your doctor. Your doctor may want to gradually reduce the dose to avoid or minimize withdrawal symptoms.    Buprenorphine/Naloxone can only be prescribed by certain physicians who have had training in the field of addictive disorders and are certified by the Drug Enforcement Agency ([employer]). Buprenorphine/Naloxone is a controlled substance and it's use is regulated by the [place] Board of Medical Examiners, [place] [employer] [facility] board and the [employer].     It is important that you are compliant in taking Buprenorphine/Naloxone strictly as prescribed. You have signed an agreement and am aware of the the risk of physical dependency, tolerance or addition to Buprenorphine/Naloxone. You have been informed of the side effects associated with this medication including constipation. You have been informed that certain medications in the benzodiazepine class including diazepam, lorazepam, alprazolam and similar medications " "should not be taken with Buprenorphine/Naloxone. You are aware that withdrawal symptoms including yawning, sweating, watery eyes, runny nose, anxiety, tremors, aching muscles, hot and cold flashes, goose bumps, abdominal pain, diarrhea and depression can occur if you suddenly discontinue or reduce the dose you are prescribed.     You have been informed that this medication should not be taken during pregnancy and you will inform me if you do plan to become or become pregnant during treatment.     You have agreed not to obtain this or other controlled medications from other physicians while taking Buprenorphine/Naloxone. You understand that I will review a history of all controlled medications prescribed to you on the [facility] Prescription Monitoring Program Database and that you must report any other controlled medication use to your physician. You will not take opioid pain medications while taking Buprenorphine/Naloxone. If you have an emergency or acute pain crises you will inform me as soon as possible.     You will come for your regularly scheduled appointments which must be at least every 90 days. You may schedule an appointment early and I can prescribe your medication in advance with a "do not fill until" date. If you have not been evaluated in the past 90 days your prescription will not be renewed. You should schedule an appointment for your return visit before you leave the office today because if I am out of the office for vacation or other reasons there is no other physician that can prescribe this medication in my absence. If you schedule an appointment and I need to cancel that visit my office will contact you to make alternative arrangements.     You should not call for refills of this medications at night, weekends or Holidays without exception.     You have agreed not to use illegal drugs while taking Buprenorphine/Naloxone including marijuana. You understand that I can request a random " drug test at any time and that you have agreed to provide this sample without advanced notice. Failure to comply with this request could result in termination of your treatment with Buprenorphine/Naloxone.     You understand that lost, stolen, misplaced, spilled medications will not be replaced. It is your responsibility to safeguard your medication. You understand that your medication cannot be filled early for any reason including work schedule conflicts, family emergencies, travel and vacations. You also have agreed not to give or sell Buprenorphine/Naloxone to anyone.     You have agreed to take this medication under your tongue properly.     If your insurance requires a prior authorization for this medication it could result in a delay of authorization for payment of your medication. I advise that you obtain information from your insurance and pharmacist to determine if you will require a prior authorization as far in advance as possible.     It is strongly recommended that you seek outpatient substance abuse treatment in addition to medical management with Buprenorphine/Naloxone. If your insurance doesn't provide this benefit or if you can't afford counseling you can seek treatment with your local AA/NA chapter or with your [facility]. Many insurances require that you are actively in outpatient treatment before they will authorize payment for Buprenorphine/Naloxone.

## 2017-09-27 NOTE — PROGRESS NOTES
Subjective:       Patient ID: Donald Sharma is a 61 y.o. male.    Chief Complaint: Drug / Alcohol Assessment    HPI The patient presents for medical management of opioid dependency. he is receiving maintenance therapy with Zubsolv. he is doing well and denies any craving or relapses. he denies any alcohol or substance abuse issues. he has had significant improvements in his relationships, social and occupational functioning. he claims to be compliant with treatment. I have reviewed his profile on the [place] board [place] Pharmacy Prescription monitoring program website and he appears to be compliant. his last refill for Zubsolv  5.7 mg # 30 was on September 22, 2017. The patient understands the risks, benefits and alternatives associated with the medical management of opioid dependency with Suboxone and has signed a Behavior and Pain Agreement for the use of Controlled Drugs. Since his last visit he had all his teeth extracted and was fitted for dentures.     Review of Systems   Constitutional: Negative for activity change and unexpected weight change.   HENT: Negative for hearing loss, rhinorrhea and trouble swallowing.    Eyes: Negative for discharge and visual disturbance.   Respiratory: Positive for wheezing. Negative for chest tightness.    Cardiovascular: Negative for chest pain and palpitations.   Gastrointestinal: Negative for blood in stool, constipation, diarrhea and vomiting.   Endocrine: Negative for polydipsia and polyuria.   Genitourinary: Negative for difficulty urinating, hematuria and urgency.   Musculoskeletal: Negative for arthralgias, joint swelling and neck pain.   Neurological: Negative for weakness and headaches.   Psychiatric/Behavioral: Negative for confusion and dysphoric mood.       Objective:      Vitals:    09/27/17 1031   BP: 110/62   Pulse: 70     Physical Exam   Constitutional: He is oriented to person, place, and time. He appears well-developed and well-nourished. He is  cooperative. No distress.   HENT:   Head: Normocephalic and atraumatic.   Right Ear: Hearing, tympanic membrane, external ear and ear canal normal.   Left Ear: Hearing, external ear and ear canal normal.   Nose: Nose normal.   Mouth/Throat: Oropharynx is clear and moist.   Eyes: Conjunctivae are normal. Pupils are equal, round, and reactive to light.   Neck: Normal range of motion. Neck supple. No thyromegaly present.   Cardiovascular: Normal rate, regular rhythm, normal heart sounds and intact distal pulses.    Pulmonary/Chest: Effort normal and breath sounds normal. No respiratory distress.   Musculoskeletal: Normal range of motion. He exhibits no edema or tenderness.   Lymphadenopathy:     He has no cervical adenopathy.   Neurological: He is alert and oriented to person, place, and time. He has normal strength. Coordination and gait normal.   Skin: Skin is warm, dry and intact. No cyanosis. Nails show no clubbing.   Psychiatric: He has a normal mood and affect. His speech is normal and behavior is normal. Judgment and thought content normal. Cognition and memory are normal.   Vitals reviewed.      Assessment:       1. Opioid dependence on agonist therapy    2. Opioid type dependence in remission    3. Colon cancer screening        Plan:       Opioid dependence on agonist therapy  -     Buprenorphine and Norbuprenorphine,urine  -     Toxicology screen, urine    Opioid type dependence in remission  -     buprenorphine-naloxone (ZUBSOLV) 5.7-1.4 mg Subl; Place 1 tablet under the tongue once daily.  Dispense: 30 tablet; Refill: 2    Colon cancer screening  -     Fecal Immunochemical Test (iFOBT); Future; Expected date: 09/27/2017      Return in about 3 months (around 12/27/2017).

## 2017-09-28 LAB
AMPHET+METHAMPHET UR QL: NEGATIVE
BARBITURATES UR QL SCN>200 NG/ML: NEGATIVE
BENZODIAZ UR QL SCN>200 NG/ML: NORMAL
BZE UR QL SCN: NEGATIVE
CANNABINOIDS UR QL SCN: NEGATIVE
CREAT UR-MCNC: 109 MG/DL
ETHANOL UR-MCNC: <10 MG/DL
METHADONE UR QL SCN>300 NG/ML: NEGATIVE
OPIATES UR QL SCN: NEGATIVE
PCP UR QL SCN>25 NG/ML: NEGATIVE
TOXICOLOGY INFORMATION: NORMAL

## 2017-09-30 LAB — BUPRENORPHINE UR-MCNC: NORMAL NG/ML

## 2017-10-02 LAB
BUPRENORPHINE CONFIRMATION URINE: 210.8 NG/ML
NORBUPRENORPHINE CONFIRMATION URINE: 418.6 NG/ML

## 2017-10-06 ENCOUNTER — LAB VISIT (OUTPATIENT)
Dept: LAB | Facility: HOSPITAL | Age: 62
End: 2017-10-06
Payer: COMMERCIAL

## 2017-10-06 DIAGNOSIS — Z12.11 COLON CANCER SCREENING: ICD-10-CM

## 2017-10-06 LAB — HEMOCCULT STL QL IA: NEGATIVE

## 2017-10-06 PROCEDURE — 82274 ASSAY TEST FOR BLOOD FECAL: CPT

## 2018-01-29 ENCOUNTER — OFFICE VISIT (OUTPATIENT)
Dept: FAMILY MEDICINE | Facility: CLINIC | Age: 63
End: 2018-01-29
Payer: COMMERCIAL

## 2018-01-29 VITALS
HEART RATE: 92 BPM | OXYGEN SATURATION: 98 % | WEIGHT: 228.81 LBS | BODY MASS INDEX: 35.91 KG/M2 | HEIGHT: 67 IN | SYSTOLIC BLOOD PRESSURE: 120 MMHG | DIASTOLIC BLOOD PRESSURE: 70 MMHG

## 2018-01-29 DIAGNOSIS — F11.20 OPIOID DEPENDENCE ON AGONIST THERAPY: Primary | ICD-10-CM

## 2018-01-29 DIAGNOSIS — M51.36 DEGENERATION OF LUMBAR INTERVERTEBRAL DISC: ICD-10-CM

## 2018-01-29 DIAGNOSIS — F11.21 OPIOID TYPE DEPENDENCE IN REMISSION: ICD-10-CM

## 2018-01-29 PROCEDURE — 99214 OFFICE O/P EST MOD 30 MIN: CPT | Mod: S$GLB,,,

## 2018-01-29 PROCEDURE — 99999 PR PBB SHADOW E&M-EST. PATIENT-LVL IV: CPT | Mod: PBBFAC,,,

## 2018-01-29 NOTE — PATIENT INSTRUCTIONS
"You are being prescribed Buprenorphine/Naloxone for opioid dependency. Buprenorphine is an opioid medication. Buprenorphine is similar to other opioids such as morphine, codeine, and heroin however, it produces less euphoric ("high") effects and therefore may be easier to stop taking.  Naloxone blocks the effects of opioids such as morphine, codeine, and heroin. If buprenorphine and naloxone is injected, naloxone will block the effects of buprenorphine and lead to withdrawal symptoms in a person with opioid dependency. When administered under the tongue as directed, naloxone will not affect the actions of buprenorphine.    Buprenorphine and naloxone can cause drug dependence. This means that withdrawal symptoms may occur if you stop using the medicine too quickly. Withdrawal symptoms may also occur at the start of treatment due to dependence on another drug. Buprenorphine and naloxone is not for occasional ("as needed") use. Do not stop taking buprenorphine and naloxone without first talking to your doctor. Your doctor may want to gradually reduce the dose to avoid or minimize withdrawal symptoms.    Buprenorphine/Naloxone can only be prescribed by certain physicians who have had training in the field of addictive disorders and are certified by the Drug Enforcement Agency ([employer]). Buprenorphine/Naloxone is a controlled substance and it's use is regulated by the [place] Board of Medical Examiners, [place] [facility] board and the [employer].     It is important that you are compliant in taking Buprenorphine/Naloxone strictly as prescribed. You have signed an agreement and am aware of the the risk of physical dependency, tolerance or addition to Buprenorphine/Naloxone. You have been informed of the side effects associated with this medication including constipation. You have been informed that certain medications in the benzodiazepine class including diazepam, lorazepam, alprazolam and similar medications " "should not be taken with Buprenorphine/Naloxone. You are aware that withdrawal symptoms including yawning, sweating, watery eyes, runny nose, anxiety, tremors, aching muscles, hot and cold flashes, goose bumps, abdominal pain, diarrhea and depression can occur if you suddenly discontinue or reduce the dose you are prescribed.     You have been informed that this medication should not be taken during pregnancy and you will inform me if you do plan to become or become pregnant during treatment.     You have agreed not to obtain this or other controlled medications from other physicians while taking Buprenorphine/Naloxone. You understand that I will review a history of all controlled medications prescribed to you on the [facility] Prescription Monitoring Program Database and that you must report any other controlled medication use to your physician. You will not take opioid pain medications while taking Buprenorphine/Naloxone. If you have an emergency or acute pain crises you will inform me as soon as possible.     You will come for your regularly scheduled appointments which must be at least every 90 days. You may schedule an appointment early and I can prescribe your medication in advance with a "do not fill until" date. If you have not been evaluated in the past 90 days your prescription will not be renewed. You should schedule an appointment for your return visit before you leave the office today because if I am out of the office for vacation or other reasons there is no other physician that can prescribe this medication in my absence. If you schedule an appointment and I need to cancel that visit my office will contact you to make alternative arrangements.     You should not call for refills of this medications at night, weekends or Holidays without exception.     You have agreed not to use illegal drugs while taking Buprenorphine/Naloxone including marijuana. You understand that I can request a random " drug test at any time and that you have agreed to provide this sample without advanced notice. Failure to comply with this request could result in termination of your treatment with Buprenorphine/Naloxone.     You understand that lost, stolen, misplaced, spilled medications will not be replaced. It is your responsibility to safeguard your medication. You understand that your medication cannot be filled early for any reason including work schedule conflicts, family emergencies, travel and vacations. You also have agreed not to give or sell Buprenorphine/Naloxone to anyone.     You have agreed to take this medication under your tongue properly.     If your insurance requires a prior authorization for this medication it could result in a delay of authorization for payment of your medication. I advise that you obtain information from your insurance and pharmacist to determine if you will require a prior authorization as far in advance as possible.     It is strongly recommended that you seek outpatient substance abuse treatment in addition to medical management with Buprenorphine/Naloxone. If your insurance doesn't provide this benefit or if you can't afford counseling you can seek treatment with your local AA/NA chapter or with your [facility]. Many insurances require that you are actively in outpatient treatment before they will authorize payment for Buprenorphine/Naloxone.

## 2018-01-29 NOTE — PROGRESS NOTES
Subjective:       Patient ID: Donald Sharma is a 62 y.o. male.    Chief Complaint: Drug / Alcohol Assessment and Back Pain    HPI The patient presents for medical management of opioid dependency. he is receiving maintenance therapy with Zubsolv. he is doing well and denies any craving or relapses. he denies any alcohol or substance abuse issues. he has had significant improvements in his relationships, social and occupational functioning. he claims to be compliant with treatment. I have reviewed his profile on the [facility] board of Pharmacy Prescription monitoring program website and he appears to be compliant. his last refill for Zubsolv  5.7 mg # 5.7 was on December 18, 2017. The patient understands the risks, benefits and alternatives associated with the medical management of opioid dependency with Zubsolv and has signed a Behavior and Pain Agreement for the use of Controlled Drugs.       He is having recurrent back pain. He has a history of degenerative lumbar disc disease. Since he quit smoking his cough, sinus and energy are better.     Review of Systems   Constitutional: Negative.  Negative for activity change and unexpected weight change.   HENT: Negative for hearing loss, rhinorrhea and trouble swallowing.    Eyes: Negative for discharge and visual disturbance.   Respiratory: Negative.  Negative for chest tightness, shortness of breath and wheezing.    Cardiovascular: Negative for chest pain and palpitations.   Gastrointestinal: Negative for blood in stool, constipation, diarrhea and vomiting.   Endocrine: Negative for polydipsia and polyuria.   Genitourinary: Negative for difficulty urinating, hematuria and urgency.   Musculoskeletal: Negative for arthralgias, joint swelling and neck pain.   Skin:        Skin tag    Neurological: Negative for weakness and headaches.   Psychiatric/Behavioral: Negative.  Negative for confusion and dysphoric mood.   All other systems reviewed and are negative.       Objective:      Vitals:    01/29/18 1313   BP: 120/70   Pulse: 92     Physical Exam   Constitutional: He is oriented to person, place, and time. He appears well-developed and well-nourished. He is cooperative. No distress.   HENT:   Head: Normocephalic and atraumatic.   Right Ear: Hearing, tympanic membrane, external ear and ear canal normal.   Left Ear: Hearing, external ear and ear canal normal.   Nose: Nose normal.   Mouth/Throat: Oropharynx is clear and moist.   Eyes: Conjunctivae are normal. Pupils are equal, round, and reactive to light.   Neck: Normal range of motion. Neck supple. No thyromegaly present.   Cardiovascular: Normal rate, regular rhythm, normal heart sounds and intact distal pulses.    Pulmonary/Chest: Effort normal and breath sounds normal. No respiratory distress.   Musculoskeletal: Normal range of motion. He exhibits no edema or tenderness.   Lymphadenopathy:     He has no cervical adenopathy.   Neurological: He is alert and oriented to person, place, and time. He has normal strength. Coordination and gait normal.   Skin: Skin is warm, dry and intact. No cyanosis. Nails show no clubbing.   Psychiatric: He has a normal mood and affect. His speech is normal and behavior is normal. Judgment and thought content normal. Cognition and memory are normal.   Vitals reviewed.      Assessment:       1. Opioid dependence on agonist therapy    2. Degeneration of lumbar intervertebral disc    3. Opioid type dependence in remission        Plan:       Opioid dependence on agonist therapy    Degeneration of lumbar intervertebral disc    Opioid type dependence in remission  -     buprenorphine-naloxone (ZUBSOLV) 5.7-1.4 mg Subl; Place 1 tablet under the tongue once daily.  Dispense: 30 tablet; Refill: 2      Follow-up in about 3 months (around 4/29/2018).

## 2018-04-23 DIAGNOSIS — F11.21 OPIOID TYPE DEPENDENCE IN REMISSION: ICD-10-CM

## 2018-04-27 ENCOUNTER — TELEPHONE (OUTPATIENT)
Dept: FAMILY MEDICINE | Facility: CLINIC | Age: 63
End: 2018-04-27

## 2018-04-27 NOTE — TELEPHONE ENCOUNTER
----- Message from Caty Miranda sent at 4/27/2018 12:39 PM CDT -----  Contact: [phone]/self  Patient called in returning your call. Please advise.

## 2018-04-30 ENCOUNTER — TELEPHONE (OUTPATIENT)
Dept: FAMILY MEDICINE | Facility: CLINIC | Age: 63
End: 2018-04-30

## 2018-04-30 NOTE — TELEPHONE ENCOUNTER
Spoke to the daughter of the patient and told her that Dr. Fontenot passed away ,and her will need to be seen for his refills of suboxone..Daughter will call back to schedule an appointment.

## 2018-04-30 NOTE — TELEPHONE ENCOUNTER
----- Message from Elaine Spencer sent at 4/30/2018  9:26 AM CDT -----  Contact: daughter/[phone] Estee Hanley daughter requesting to speak with the nurse concerning the patient prescriptions  Please call and advise

## 2018-05-01 RX ORDER — BUPRENORPHINE HYDROCHLORIDE AND NALOXONE HYDROCHLORIDE 5.7; 1.4 MG/1; MG/1
TABLET, ORALLY DISINTEGRATING SUBLINGUAL
Qty: 30 TABLET | Refills: 2 | OUTPATIENT
Start: 2018-05-01

## 2018-05-07 ENCOUNTER — OFFICE VISIT (OUTPATIENT)
Dept: FAMILY MEDICINE | Facility: CLINIC | Age: 63
End: 2018-05-07
Payer: COMMERCIAL

## 2018-05-07 VITALS
HEIGHT: 67 IN | OXYGEN SATURATION: 97 % | TEMPERATURE: 98 F | HEART RATE: 80 BPM | DIASTOLIC BLOOD PRESSURE: 78 MMHG | SYSTOLIC BLOOD PRESSURE: 124 MMHG | WEIGHT: 228.31 LBS | BODY MASS INDEX: 35.83 KG/M2

## 2018-05-07 DIAGNOSIS — F11.21 OPIOID DEPENDENCE IN REMISSION: Primary | ICD-10-CM

## 2018-05-07 DIAGNOSIS — F11.21 OPIOID TYPE DEPENDENCE IN REMISSION: ICD-10-CM

## 2018-05-07 LAB
AMP D-AMPHETAMINE 1000 NG/ML: NEGATIVE
BAR SECOBARBITAL 300 NG/ML: NEGATIVE
BUP BUPRENORPHINE 10 NG/ML: POSITIVE
BZO OXAZEPAM 300 NG/ML: NEGATIVE
COC BENZOYLECGONINE 300 NG/ML: NEGATIVE
CTP QC/QA: YES
MET D-METHAMPHETAMINE 500 NG/ML: NEGATIVE
MOP MORPHINE 300 NG/ML: NEGATIVE
MTD METHADONE 300 NG/ML: POSITIVE
QXY OXYCODONE 100 NG/ML: NEGATIVE
THC 11-NOR-9-TETRAHYDROCANNABINOL-9-CARBOXYLIC ACID: NEGATIVE

## 2018-05-07 PROCEDURE — 99214 OFFICE O/P EST MOD 30 MIN: CPT | Mod: S$GLB,,, | Performed by: FAMILY MEDICINE

## 2018-05-07 PROCEDURE — 99999 PR PBB SHADOW E&M-EST. PATIENT-LVL III: CPT | Mod: PBBFAC,,, | Performed by: FAMILY MEDICINE

## 2018-05-07 PROCEDURE — 80305 DRUG TEST PRSMV DIR OPT OBS: CPT | Mod: QW,S$GLB,, | Performed by: FAMILY MEDICINE

## 2018-05-07 NOTE — PROGRESS NOTES
Subjective:       Patient ID: Donald Sharma is a 62 y.o. male.    Chief Complaint: No chief complaint on file.    61 y/o male with hx of CChronic LBP over 20 years ago, was on opiods and then switched to Suboxone  Over 12 years , now on zubsolv,  Here for follow up, denies any relapse or craving, has been well controlled on once daily dosing, denies any chronic back pain,   Has hx of HTN well controlled, also has hx of Asthma for which he uses inhaler with good control. Denies any use of any other drugs.Pt. Is ex smoker and now on vap.  Pt is  lives with wife and kids, works as  for [employer].  Pt does  Use Sudafed on prn basis for sinus problems  Uis UDS instant is positive for BUP and MTD,  Discussed with pt tapering off of medication, which he agrees to.. Pt. Is attentive and compliant. His LA  does not show any other drug hx.      Review of Systems    Objective:      Physical Exam   Constitutional: He is oriented to person, place, and time. He appears well-developed and well-nourished. He is cooperative.  Non-toxic appearance. He does not appear ill. No distress.   HENT:   Head: Normocephalic and atraumatic.   Right Ear: Hearing, tympanic membrane, external ear and ear canal normal.   Left Ear: Hearing, tympanic membrane, external ear and ear canal normal.   Nose: Nose normal. No mucosal edema, rhinorrhea or nasal deformity. No epistaxis. Right sinus exhibits no maxillary sinus tenderness and no frontal sinus tenderness. Left sinus exhibits no maxillary sinus tenderness and no frontal sinus tenderness.   Mouth/Throat: Uvula is midline, oropharynx is clear and moist and mucous membranes are normal. No trismus in the jaw. Normal dentition. No uvula swelling. No oropharyngeal exudate or posterior oropharyngeal erythema.   Eyes: Conjunctivae and lids are normal. Right eye exhibits no discharge. No scleral icterus.   Sclera clear bilat   Neck: Trachea normal, normal range of motion, full passive  range of motion without pain and phonation normal. Neck supple.   Cardiovascular: Normal rate, regular rhythm, normal heart sounds, intact distal pulses and normal pulses.  Exam reveals no friction rub.    No murmur heard.  Pulmonary/Chest: Effort normal and breath sounds normal. He has no wheezes.   Abdominal: Soft. Normal appearance and bowel sounds are normal. He exhibits no distension, no pulsatile midline mass and no mass. There is no tenderness.   Musculoskeletal: Normal range of motion. He exhibits no edema or deformity.   Right distal wrist area with chronic lipoma non tender  About 4 cm in size   Lymphadenopathy:     He has no cervical adenopathy.   Neurological: He is alert and oriented to person, place, and time. He exhibits normal muscle tone. Coordination normal.   Skin: Skin is warm, dry and intact. He is not diaphoretic. No pallor.   Psychiatric: He has a normal mood and affect. His speech is normal and behavior is normal. Judgment and thought content normal. Cognition and memory are normal.   Nursing note and vitals reviewed.      Assessment:       1. Opioid dependence in remission    2. Opioid type dependence in remission        Plan:         Cont Zubsolv one daily , with instruction to taper off gradually.

## 2018-05-10 DIAGNOSIS — R91.1 LUNG NODULE: ICD-10-CM

## 2018-06-05 ENCOUNTER — HOSPITAL ENCOUNTER (OUTPATIENT)
Dept: PULMONOLOGY | Facility: CLINIC | Age: 63
Discharge: HOME | End: 2018-06-05
Payer: COMMERCIAL

## 2018-06-05 ENCOUNTER — OFFICE VISIT (OUTPATIENT)
Dept: PULMONOLOGY | Facility: CLINIC | Age: 63
End: 2018-06-05
Payer: COMMERCIAL

## 2018-06-05 ENCOUNTER — HOSPITAL ENCOUNTER (OUTPATIENT)
Dept: RADIOLOGY | Facility: HOSPITAL | Age: 63
Discharge: HOME | End: 2018-06-05
Attending: INTERNAL MEDICINE
Payer: COMMERCIAL

## 2018-06-05 VITALS
SYSTOLIC BLOOD PRESSURE: 118 MMHG | HEIGHT: 66 IN | DIASTOLIC BLOOD PRESSURE: 78 MMHG | HEART RATE: 80 BPM | RESPIRATION RATE: 16 BRPM | OXYGEN SATURATION: 95 % | WEIGHT: 228.38 LBS | BODY MASS INDEX: 36.7 KG/M2

## 2018-06-05 DIAGNOSIS — F17.200 TOBACCO DEPENDENCE SYNDROME: ICD-10-CM

## 2018-06-05 DIAGNOSIS — J44.9 CHRONIC OBSTRUCTIVE PULMONARY DISEASE, UNSPECIFIED COPD TYPE: ICD-10-CM

## 2018-06-05 DIAGNOSIS — G47.33 OSA (OBSTRUCTIVE SLEEP APNEA): ICD-10-CM

## 2018-06-05 DIAGNOSIS — I10 ESSENTIAL HYPERTENSION: ICD-10-CM

## 2018-06-05 DIAGNOSIS — J44.9 CHRONIC OBSTRUCTIVE PULMONARY DISEASE, UNSPECIFIED COPD TYPE: Primary | ICD-10-CM

## 2018-06-05 DIAGNOSIS — R91.1 LUNG NODULE: ICD-10-CM

## 2018-06-05 DIAGNOSIS — E66.01 CLASS 2 SEVERE OBESITY DUE TO EXCESS CALORIES WITH SERIOUS COMORBIDITY AND BODY MASS INDEX (BMI) OF 36.0 TO 36.9 IN ADULT: ICD-10-CM

## 2018-06-05 LAB
POST FEV1 FVC: 0.53
POST FEV1: 1.44
POST FVC: 2.71
PRE FEV1 FVC: 53
PRE FEV1: 1.45
PRE FVC: 2.73
PREDICTED FEV1 FVC: 81
PREDICTED FEV1: 2.86
PREDICTED FVC: 3.54

## 2018-06-05 PROCEDURE — 71046 X-RAY EXAM CHEST 2 VIEWS: CPT | Mod: 26,,, | Performed by: RADIOLOGY

## 2018-06-05 PROCEDURE — 94729 DIFFUSING CAPACITY: CPT | Mod: S$GLB,,, | Performed by: INTERNAL MEDICINE

## 2018-06-05 PROCEDURE — 71046 X-RAY EXAM CHEST 2 VIEWS: CPT | Mod: TC

## 2018-06-05 PROCEDURE — 99214 OFFICE O/P EST MOD 30 MIN: CPT | Mod: 25,S$GLB,, | Performed by: INTERNAL MEDICINE

## 2018-06-05 PROCEDURE — 3008F BODY MASS INDEX DOCD: CPT | Mod: CPTII,S$GLB,, | Performed by: INTERNAL MEDICINE

## 2018-06-05 PROCEDURE — 3078F DIAST BP <80 MM HG: CPT | Mod: CPTII,S$GLB,, | Performed by: INTERNAL MEDICINE

## 2018-06-05 PROCEDURE — 94060 EVALUATION OF WHEEZING: CPT | Mod: S$GLB,,, | Performed by: INTERNAL MEDICINE

## 2018-06-05 PROCEDURE — 3074F SYST BP LT 130 MM HG: CPT | Mod: CPTII,S$GLB,, | Performed by: INTERNAL MEDICINE

## 2018-06-05 PROCEDURE — 99999 PR PBB SHADOW E&M-EST. PATIENT-LVL III: CPT | Mod: PBBFAC,,, | Performed by: INTERNAL MEDICINE

## 2018-06-06 ENCOUNTER — TELEPHONE (OUTPATIENT)
Dept: PULMONOLOGY | Facility: CLINIC | Age: 63
End: 2018-06-06

## 2018-06-06 RX ORDER — AZELASTINE 1 MG/ML
SPRAY, METERED NASAL
Status: ON HOLD | COMMUNITY
End: 2019-01-01 | Stop reason: HOSPADM

## 2018-06-06 RX ORDER — CHLORHEXIDINE GLUCONATE ORAL RINSE 1.2 MG/ML
SOLUTION DENTAL
COMMUNITY
End: 2018-07-16

## 2018-06-06 RX ORDER — IBUPROFEN 800 MG/1
TABLET ORAL
COMMUNITY
End: 2018-07-16

## 2018-06-06 NOTE — PROGRESS NOTES
Subjective:       Patient ID: Donald Sharma is a 62 y.o. male.    Chief Complaint: COPD    HPI HISTORY OF PRESENT ILLNESS:  Mr. Sharma is a 62-year-old former smoker who   comes for followup evaluation of chronic obstructive lung disease.  He had   previous visits in this clinic in 2016 and 2017.  Pulmonary function studies   done at the time of those visits showed evidence for obstruction with severe   ventilatory impairment.  He is currently using Spiriva once daily; and he also has   Albuterol to use when needed.  He estimates that on average he uses the   albuterol two to three times per week.  He reports a mild cough with minimal   amount of sputum.  He has not had any recent episodes of respiratory infections.    He describes being out of breath with moderate exertion.    Mr. Sharma also has the diagnosis of obstructive sleep apnea.  He is using   CPAP for control of this condition.  He feels that he has acclimated well to its   use.  He has the history of high blood pressure.  The medicines he uses for blood   pressure control include lisinopril.  He does not know of any past history for   cardiac dysfunction.    Mr. Sharma reports that he has not smoked within the past several months.  He   works as a .  He has forms which pertain to his license renewal.    These indicate that he may need further cardiopulmonary testing if lung   function studies are significantly abnormal.    At the time of his previous visit, Mr. Sharma had a screening CT scan of the   chest.  This was noted to show a 4-mm nodule in the lateral aspect of the left   upper lobe.  He is unable to do a followup CT scan at this time due to the high   co-pay for this study.      DESTINY/MAGGIE  dd: 06/05/2018 21:08:03 (CDT)  td: 06/06/2018 18:03:49 (CDT)  Doc ID   #[id number]  Job ID #[id number]    CC:       Review of Systems   Constitutional: Negative for fever and fatigue.   HENT: Positive for congestion. Negative for postnasal  drip, sinus pressure and voice change.    Respiratory: Positive for shortness of breath and dyspnea on extertion. Negative for cough, sputum production and wheezing.    Cardiovascular: Negative for chest pain and leg swelling.   Genitourinary: Negative for difficulty urinating.   Musculoskeletal: Negative for arthralgias and back pain.   Skin: Negative for rash.   Gastrointestinal: Negative for abdominal pain and acid reflux.   Neurological: Negative for dizziness and weakness.   Hematological: Negative for adenopathy.   Psychiatric/Behavioral: Positive for sleep disturbance.       Objective:      Physical Exam   Constitutional: He is oriented to person, place, and time. He appears well-developed. He is obese.   HENT:   Head: Normocephalic.   Mouth/Throat: Oropharynx is clear and moist. No oropharyngeal exudate.   Neck: Normal range of motion. Neck supple. No JVD present. No tracheal deviation present. No thyromegaly present.   Cardiovascular: Normal rate, regular rhythm and normal heart sounds.    No murmur heard.  Pulmonary/Chest: Symmetric chest wall expansion. No stridor. He has decreased breath sounds. He has no wheezes. He has no rhonchi. He has no rales. He exhibits no tenderness.   Abdominal: Soft.   Musculoskeletal: He exhibits no edema.   Lymphadenopathy:     He has no cervical adenopathy.   Neurological: He is alert and oriented to person, place, and time.   Skin: Skin is warm and dry. No rash noted. No erythema. Nails show no clubbing.   Psychiatric: He has a normal mood and affect.   Vitals reviewed.    Personal Diagnostic Review    No flowsheet data found.      Assessment:       1. Chronic obstructive pulmonary disease, unspecified COPD type    2. EMILIANO (obstructive sleep apnea)    3. Class 2 severe obesity due to excess calories with serious comorbidity and body mass index (BMI) of 36.0 to 36.9 in adult    4. Lung nodule    5. Tobacco dependence syndrome    6. Essential hypertension        Outpatient  Encounter Prescriptions as of 6/5/2018   Medication Sig Dispense Refill    albuterol (VENTOLIN HFA) 90 mcg/actuation inhaler Inhale 2 puffs into the lungs every 6 (six) hours. 1 Inhaler 11    betamethasone valerate 0.1% (VALISONE) 0.1 % Lotn Apply topically 2 (two) times daily. 60 mL 11    buprenorphine-naloxone (ZUBSOLV) 5.7-1.4 mg Subl Place 1 tablet under the tongue once daily. 30 tablet 2    chlorhexidine (PERIDEX) 0.12 % solution       lisinopril (PRINIVIL,ZESTRIL) 40 MG tablet TAKE 1 TABLET (40 MG TOTAL) BY MOUTH ONCE DAILY. 30 tablet 11    pseudoephedrine (SUDAFED) 120 mg 12 hr tablet Take 120 mg by mouth every 12 (twelve) hours.      tamsulosin (FLOMAX) 0.4 mg Cp24 TAKE 1 CAPSULE (0.4 MG TOTAL) BY MOUTH ONCE DAILY. 30 capsule 11    tiotropium (SPIRIVA WITH HANDIHALER) 18 mcg inhalation capsule Inhale 1 capsule (18 mcg total) into the lungs once daily. 30 capsule 11     No facility-administered encounter medications on file as of 6/5/2018.      Orders Placed This Encounter   Procedures    X-Ray Chest PA And Lateral     Standing Status:   Future     Number of Occurrences:   1     Standing Expiration Date:   6/5/2019    CBC auto differential     Standing Status:   Future     Number of Occurrences:   1     Standing Expiration Date:   6/5/2019    Comprehensive metabolic panel     Standing Status:   Future     Number of Occurrences:   1     Standing Expiration Date:   6/5/2019    Spirometry with/without bronchodilator     Standing Status:   Future     Number of Occurrences:   1     Standing Expiration Date:   6/5/2019    DLCO-Carbon Monoxide Diffusing Capacity     Standing Status:   Future     Number of Occurrences:   1     Standing Expiration Date:   6/5/2019     Plan:     CBC, CMP, Pre/Post Spirometry, DLCO, and CXR.  Continue present respiratory medications (roles reviewed at today's visit).  Discussed the early role for antibiotics for treatment of respiratory infection.  Phone review.  (License  renewal form indicates he may need CPX study if PFTs are abnormal.)

## 2018-06-06 NOTE — PATIENT INSTRUCTIONS
CBC, CMP, Pre/Post Spirometry, DLCO, and CXR.  Continue present respiratory medications (roles reviewed at today's visit).  Discussed the early role for antibiotics for treatment of respiratory infection.  Phone review.  (License renewal form indicates he may need CPX study if PFTs are abnormal.)

## 2018-06-27 ENCOUNTER — TELEPHONE (OUTPATIENT)
Dept: PULMONOLOGY | Facility: CLINIC | Age: 63
End: 2018-06-27

## 2018-06-28 ENCOUNTER — OFFICE VISIT (OUTPATIENT)
Dept: PULMONOLOGY | Facility: CLINIC | Age: 63
End: 2018-06-28
Payer: COMMERCIAL

## 2018-06-28 VITALS
HEIGHT: 66 IN | OXYGEN SATURATION: 96 % | SYSTOLIC BLOOD PRESSURE: 124 MMHG | RESPIRATION RATE: 14 BRPM | BODY MASS INDEX: 37.21 KG/M2 | DIASTOLIC BLOOD PRESSURE: 90 MMHG | HEART RATE: 80 BPM | WEIGHT: 231.5 LBS

## 2018-06-28 DIAGNOSIS — F17.200 TOBACCO DEPENDENCE SYNDROME: ICD-10-CM

## 2018-06-28 DIAGNOSIS — J41.0 SIMPLE CHRONIC BRONCHITIS: Primary | ICD-10-CM

## 2018-06-28 DIAGNOSIS — D64.9 ANEMIA, UNSPECIFIED TYPE: ICD-10-CM

## 2018-06-28 DIAGNOSIS — E66.01 CLASS 2 SEVERE OBESITY DUE TO EXCESS CALORIES WITH SERIOUS COMORBIDITY AND BODY MASS INDEX (BMI) OF 36.0 TO 36.9 IN ADULT: ICD-10-CM

## 2018-06-28 DIAGNOSIS — G47.33 OSA (OBSTRUCTIVE SLEEP APNEA): ICD-10-CM

## 2018-06-28 PROCEDURE — 99214 OFFICE O/P EST MOD 30 MIN: CPT | Mod: S$GLB,,, | Performed by: INTERNAL MEDICINE

## 2018-06-28 PROCEDURE — 3074F SYST BP LT 130 MM HG: CPT | Mod: CPTII,S$GLB,, | Performed by: INTERNAL MEDICINE

## 2018-06-28 PROCEDURE — 99999 PR PBB SHADOW E&M-EST. PATIENT-LVL III: CPT | Mod: PBBFAC,,, | Performed by: INTERNAL MEDICINE

## 2018-06-28 PROCEDURE — 3080F DIAST BP >= 90 MM HG: CPT | Mod: CPTII,S$GLB,, | Performed by: INTERNAL MEDICINE

## 2018-06-28 PROCEDURE — 3008F BODY MASS INDEX DOCD: CPT | Mod: CPTII,S$GLB,, | Performed by: INTERNAL MEDICINE

## 2018-06-28 RX ORDER — ALBUTEROL SULFATE 90 UG/1
AEROSOL, METERED RESPIRATORY (INHALATION)
COMMUNITY
End: 2018-07-16 | Stop reason: SDUPTHER

## 2018-06-28 RX ORDER — LISINOPRIL 40 MG/1
TABLET ORAL
COMMUNITY
End: 2018-07-16 | Stop reason: SDUPTHER

## 2018-06-28 RX ORDER — TIOTROPIUM BROMIDE 18 UG/1
CAPSULE ORAL; RESPIRATORY (INHALATION)
COMMUNITY
End: 2018-07-16 | Stop reason: SDUPTHER

## 2018-06-28 NOTE — PROGRESS NOTES
Subjective:       Patient ID: Donald Sharma is a 62 y.o. male.    Chief Complaint: Results    HPI REVIEW VISIT    HISTORY OF PRESENT ILLNESS:  Mr. Sharma is a 62-year-old former smoker, who   returns to follow up results from recent studies.  Today, he reports regular use   of Spiriva.  He also has albuterol, which he uses if needed.  He is not having   any symptoms to suggest an active respiratory infection.  He has not had any   recent episodes of wheezing.    Pulmonary function studies done at the time of his previous visit shows evidence   for obstruction with a moderate ventilatory impairment.  There was no change in   the post-bronchodilator spirometry.  The diffusion capacity is maintained   within the normal range.  The current pulmonary function studies show a   significant improvement in ventilatory function when compared to the previous   study from 2016.  This may reflect the fact that during that interval Mr. Sharma has discontinued smoking.    Laboratory studies do not show any significant abnormal findings in the   comprehensive metabolic profile.  The CBC shows a mild anemia.  There are no   previous CBCs available for comparison.    A chest x-ray shows no acute cardiovascular abnormalities.  The lungs appear   clear.  The left upper lobe micro nodule, which was seen in a previous screening   CT scan of the chest, is not visible in this plain radiograph.      CB/HN  dd: 06/28/2018 21:23:31 (CDT)  td: 06/29/2018 13:16:50 (CDT)  Doc ID   #[id number]  Job ID #[id number]    CC:       Review of Systems    Objective:      Physical Exam  Personal Diagnostic Review    No flowsheet data found.      Assessment:       1. Simple chronic bronchitis    2. Class 2 severe obesity due to excess calories with serious comorbidity and body mass index (BMI) of 36.0 to 36.9 in adult    3. Tobacco dependence syndrome    4. EMILIANO (obstructive sleep apnea)    5. Anemia, unspecified type        Outpatient Encounter  Prescriptions as of 6/28/2018   Medication Sig Dispense Refill    albuterol (VENTOLIN HFA) 90 mcg/actuation inhaler Inhale 2 puffs into the lungs every 6 (six) hours. 1 Inhaler 11    albuterol (VENTOLIN HFA) 90 mcg/actuation inhaler Ventolin HFA 90 mcg/actuation aerosol inhaler      azelastine (ASTELIN) 137 mcg (0.1 %) nasal spray azelastine 137 mcg (0.1 %) nasal spray aerosol   Spray 2 sprays twice a day by intranasal route.      buprenorphine-naloxone (ZUBSOLV) 5.7-1.4 mg Subl Place 1 tablet under the tongue once daily. 30 tablet 2    buprenorphine-naloxone (ZUBSOLV) 5.7-1.4 mg Subl Zubsolv 5.7 mg-1.4 mg sublingual tablet      chlorhexidine (PERIDEX) 0.12 % solution       chlorhexidine (PERIDEX) 0.12 % solution chlorhexidine gluconate 0.12 % mouthwash      ibuprofen (ADVIL,MOTRIN) 800 MG tablet ibuprofen 800 mg tablet      lisinopril (PRINIVIL,ZESTRIL) 40 MG tablet TAKE 1 TABLET (40 MG TOTAL) BY MOUTH ONCE DAILY. 30 tablet 11    lisinopril (PRINIVIL,ZESTRIL) 40 MG tablet lisinopril 40 mg tablet      pseudoephedrine (SUDAFED) 120 mg 12 hr tablet Take 120 mg by mouth every 12 (twelve) hours.      tamsulosin (FLOMAX) 0.4 mg Cp24 TAKE 1 CAPSULE (0.4 MG TOTAL) BY MOUTH ONCE DAILY. 30 capsule 11    tiotropium (SPIRIVA WITH HANDIHALER) 18 mcg inhalation capsule Inhale 1 capsule (18 mcg total) into the lungs once daily. 30 capsule 11    tiotropium (SPIRIVA WITH HANDIHALER) 18 mcg inhalation capsule Spiriva with HandiHaler 18 mcg and inhalation capsules      betamethasone valerate 0.1% (VALISONE) 0.1 % Lotn Apply topically 2 (two) times daily. 60 mL 11     No facility-administered encounter medications on file as of 6/28/2018.      Orders Placed This Encounter   Procedures    CPX STUDY     Standing Status:   Future     Standing Expiration Date:   6/28/2019     Plan:     Continue present respiratory medications (roles reviewed at today's visit).  CPX study as directed in his application for   licence renewal (FEV1< 70% Pred).  Discuss finding of anemia with PCP.  Return visit 6 months.    NOTE:  30 min visit with all time counseling regarding results from recent studies.

## 2018-06-28 NOTE — PATIENT INSTRUCTIONS
Continue present respiratory medications (roles reviewed at today's visit).  CPX study as directed in his application for  licence renewal (FEV1< 70% Pred).  Discuss finding of anemia with PCP.  Return visit 6 months.

## 2018-07-06 ENCOUNTER — HOSPITAL ENCOUNTER (OUTPATIENT)
Dept: CARDIOLOGY | Facility: CLINIC | Age: 63
Discharge: HOME | End: 2018-07-06
Attending: INTERNAL MEDICINE
Payer: COMMERCIAL

## 2018-07-06 DIAGNOSIS — J41.0 SIMPLE CHRONIC BRONCHITIS: ICD-10-CM

## 2018-07-09 ENCOUNTER — TELEPHONE (OUTPATIENT)
Dept: FAMILY MEDICINE | Facility: CLINIC | Age: 63
End: 2018-07-09

## 2018-07-09 NOTE — TELEPHONE ENCOUNTER
----- Message from Tricia Salgado sent at 2018 10:41 AM CDT -----  Contact: Daughter- Estee Sharma  MRN: [id number]  : 1955  PCP: Galileo Fontenot  Home Phone      [phone]  Work Phone      Not on file.  Mobile          [phone]      MESSAGE:   Patient would like to get new primary care since Dr. Fontenot passed away, requested . Please advise.  Phone:  [phone]

## 2018-07-09 NOTE — TELEPHONE ENCOUNTER
Pt has seen Dr Mayo Lew in [place] on 5/7/18.  UDS instant is positive for BUP and MTD,  Pt has rx for 5/7/18, 6/5/18,7/4/18    Pt is on zubsolv 5.7-1.4- once daily     See  on your desk    Will you accept?

## 2018-07-13 ENCOUNTER — HOSPITAL ENCOUNTER (OUTPATIENT)
Dept: CARDIOLOGY | Facility: CLINIC | Age: 63
Discharge: HOME | End: 2018-07-13
Payer: COMMERCIAL

## 2018-07-13 PROCEDURE — 94621 CARDIOPULM EXERCISE TESTING: CPT | Mod: S$GLB,,, | Performed by: INTERNAL MEDICINE

## 2018-07-16 ENCOUNTER — TELEPHONE (OUTPATIENT)
Dept: PULMONOLOGY | Facility: CLINIC | Age: 63
End: 2018-07-16

## 2018-07-16 ENCOUNTER — OFFICE VISIT (OUTPATIENT)
Dept: FAMILY MEDICINE | Facility: CLINIC | Age: 63
End: 2018-07-16
Payer: COMMERCIAL

## 2018-07-16 VITALS
HEART RATE: 76 BPM | DIASTOLIC BLOOD PRESSURE: 70 MMHG | RESPIRATION RATE: 20 BRPM | SYSTOLIC BLOOD PRESSURE: 116 MMHG | WEIGHT: 231.81 LBS | HEIGHT: 66 IN | BODY MASS INDEX: 37.26 KG/M2

## 2018-07-16 DIAGNOSIS — I10 ESSENTIAL HYPERTENSION: ICD-10-CM

## 2018-07-16 DIAGNOSIS — F11.21 OPIOID TYPE DEPENDENCE IN REMISSION: Primary | ICD-10-CM

## 2018-07-16 DIAGNOSIS — N31.8 HYPERTONICITY OF BLADDER: ICD-10-CM

## 2018-07-16 DIAGNOSIS — N40.1 BENIGN NON-NODULAR PROSTATIC HYPERPLASIA WITH LOWER URINARY TRACT SYMPTOMS: ICD-10-CM

## 2018-07-16 LAB
AMPHETAMINE, QUAL, UR: NEGATIVE
COCAINE (METABOLITE), QUAL, UR: NEGATIVE
METHAMPHETAMINE, URINE SCREEN: NEGATIVE
OPIATE SCREEN, URINE: NEGATIVE
THC, UR: NEGATIVE

## 2018-07-16 PROCEDURE — 3074F SYST BP LT 130 MM HG: CPT | Mod: CPTII,S$GLB,, | Performed by: FAMILY MEDICINE

## 2018-07-16 PROCEDURE — 99214 OFFICE O/P EST MOD 30 MIN: CPT | Mod: S$GLB,,, | Performed by: FAMILY MEDICINE

## 2018-07-16 PROCEDURE — 3078F DIAST BP <80 MM HG: CPT | Mod: CPTII,S$GLB,, | Performed by: FAMILY MEDICINE

## 2018-07-16 PROCEDURE — 99999 PR PBB SHADOW E&M-EST. PATIENT-LVL III: CPT | Mod: PBBFAC,,, | Performed by: FAMILY MEDICINE

## 2018-07-16 PROCEDURE — 99000 SPECIMEN HANDLING OFFICE-LAB: CPT | Mod: S$GLB,,, | Performed by: FAMILY MEDICINE

## 2018-07-16 PROCEDURE — 3008F BODY MASS INDEX DOCD: CPT | Mod: CPTII,S$GLB,, | Performed by: FAMILY MEDICINE

## 2018-07-16 PROCEDURE — 80305 DRUG TEST PRSMV DIR OPT OBS: CPT | Mod: QW,S$GLB,, | Performed by: FAMILY MEDICINE

## 2018-07-16 RX ORDER — LISINOPRIL 40 MG/1
40 TABLET ORAL DAILY
Qty: 30 TABLET | Refills: 5 | Status: SHIPPED | OUTPATIENT
Start: 2018-07-16 | End: 2018-08-13 | Stop reason: SDUPTHER

## 2018-07-16 RX ORDER — TAMSULOSIN HYDROCHLORIDE 0.4 MG/1
CAPSULE ORAL
Qty: 30 CAPSULE | Refills: 5 | Status: ON HOLD | OUTPATIENT
Start: 2018-07-16 | End: 2019-01-01 | Stop reason: HOSPADM

## 2018-07-16 RX ORDER — ASPIRIN 81 MG/1
81 TABLET ORAL DAILY
Status: ON HOLD | COMMUNITY
End: 2019-01-01 | Stop reason: HOSPADM

## 2018-07-16 NOTE — PROGRESS NOTES
Subjective:       Patient ID: Donald Sharma is a 62 y.o. male.    Chief Complaint: Establish Care (suboxone- ok'd per [facility])    The patient presents for medical management of opioid dependency. he is receiving maintenance therapy with Zubsolv.  He was seen Dr. Galileo babcock all; however, in he passed away.  he is doing well and denies any craving or relapses. he denies any alcohol or substance abuse issues. he has had significant improvements in his relationships, social and occupational functioning. he claims to be compliant with treatment. I have reviewed his profile on the [facility] board of Pharmacy Prescription monitoring program website and he appears to be compliant.  He has lowered his dose and takes half of a tablet of Zubsolv 5.9 mg.   The patient understands the risks, benefits and alternatives associated with the medical management of opioid dependency with Zubsolv and has signed a Behavior and Pain Agreement for the use of Controlled Drugs.  He is trying to lower his dose and wean off.  He is seeing a addiction counselor in [place] on a regular basis.  HE tolerates his blood pressure medication well and is not having side effects such as chronic cough or dizziness.          Review of Systems   Constitutional: Positive for activity change and unexpected weight change.   HENT: Negative for hearing loss, rhinorrhea and trouble swallowing.    Eyes: Negative for discharge and visual disturbance.   Respiratory: Positive for wheezing. Negative for chest tightness.    Cardiovascular: Negative for chest pain and palpitations.   Gastrointestinal: Negative for blood in stool, constipation, diarrhea and vomiting.   Endocrine: Negative for polydipsia and polyuria.   Genitourinary: Positive for difficulty urinating. Negative for hematuria and urgency.   Musculoskeletal: Positive for arthralgias. Negative for joint swelling and neck pain.   Neurological: Negative for weakness and headaches.    Psychiatric/Behavioral: Negative for confusion and dysphoric mood.       Objective:      Vitals:    07/16/18 1008   BP: 116/70   Pulse: 76   Resp: 20     Physical Exam   Constitutional: He is oriented to person, place, and time. He appears well-developed and well-nourished.   HENT:   Head: Normocephalic and atraumatic.   Right Ear: Tympanic membrane, external ear and ear canal normal.   Left Ear: Tympanic membrane, external ear and ear canal normal.   Nose: Nose normal.   Mouth/Throat: Oropharynx is clear and moist.   Eyes: Conjunctivae and EOM are normal. Pupils are equal, round, and reactive to light.   Neck: Normal range of motion. Neck supple. No tracheal deviation present. No thyromegaly present.   Cardiovascular: Normal rate, regular rhythm, normal heart sounds and intact distal pulses.  Exam reveals no gallop.    No murmur heard.  Pulmonary/Chest: Effort normal and breath sounds normal.   Abdominal: Soft. Bowel sounds are normal. He exhibits no distension and no mass. There is no tenderness. There is no rebound and no guarding. Hernia confirmed negative in the right inguinal area and confirmed negative in the left inguinal area.   Genitourinary: Rectum normal and prostate normal.   Musculoskeletal: Normal range of motion.   Neurological: He is alert and oriented to person, place, and time. He has normal reflexes.   Skin: Skin is warm and dry.   Psychiatric: He has a normal mood and affect. His behavior is normal. Judgment and thought content normal.       No results found for: LDLCALC  BMP  Lab Results   Component Value Date     06/05/2018    K 4.6 06/05/2018     06/05/2018    CO2 29 06/05/2018    BUN 8 06/05/2018    CREATININE 1.0 06/05/2018    CALCIUM 9.8 06/05/2018    ANIONGAP 8 06/05/2018    ESTGFRAFRICA >60.0 06/05/2018    EGFRNONAA >60.0 06/05/2018     Lab Results   Component Value Date    WBC 14.28 (H) 06/05/2018    HGB 11.4 (L) 06/05/2018    HCT 35.4 (L) 06/05/2018    MCV 96 06/05/2018      06/05/2018     Assessment:       1. Opioid type dependence in remission    2. Benign non-nodular prostatic hyperplasia with lower urinary tract symptoms    3. Hypertonicity of bladder    4. Essential hypertension        Plan:   Donald was seen today for establish care.    Diagnoses and all orders for this visit:    Opioid type dependence in remission  1.  A prescription for Zubsolv 2.9 mg daily written  2.  Patient is encouraged to continue therapy  3.  Patient will return to clinic in one month  4.  A urinary drug screen done today   5.  Patient encouraged to avoid places and friends that are associated with past drug  use.  6.  15 minutes was spent with patient.  At that time was counseling the patient about dependence, anxiety issues, and relationships  -     buprenorphine-naloxone (ZUBSOLV) 2.9-0.71 mg Subl; Place 1 tablet under the tongue once daily.    Benign non-nodular prostatic hyperplasia with lower urinary tract symptoms  -     tamsulosin (FLOMAX) 0.4 mg Cap; TAKE 1 CAPSULE (0.4 MG TOTAL) BY MOUTH ONCE DAILY.    Essential hypertension  Two gram sodium diet.    Weight loss discussed.    Try to walk 2 miles per day.    Avoid smoking.    Current medications will be:  -     lisinopril (PRINIVIL,ZESTRIL) 40 MG tablet; Take 1 tablet (40 mg total) by mouth once daily.    RTC in 1 month

## 2018-07-17 ENCOUNTER — TELEPHONE (OUTPATIENT)
Dept: CARDIOLOGY | Facility: CLINIC | Age: 63
End: 2018-07-17

## 2018-07-17 LAB — DIASTOLIC DYSFUNCTION: NO

## 2018-07-17 NOTE — TELEPHONE ENCOUNTER
----- Message from Sudha Ulrich MA sent at 7/17/2018  9:23 AM CDT -----  Contact: Elizabeth Limon would like to talk to  about the patient CPX. Please call ext. [phone]. Thank you.

## 2018-07-31 ENCOUNTER — PATIENT MESSAGE (OUTPATIENT)
Dept: PULMONOLOGY | Facility: CLINIC | Age: 63
End: 2018-07-31

## 2018-08-08 DIAGNOSIS — J41.0 SIMPLE CHRONIC BRONCHITIS: Primary | ICD-10-CM

## 2018-08-13 ENCOUNTER — OFFICE VISIT (OUTPATIENT)
Dept: FAMILY MEDICINE | Facility: CLINIC | Age: 63
End: 2018-08-13
Payer: COMMERCIAL

## 2018-08-13 VITALS
RESPIRATION RATE: 20 BRPM | BODY MASS INDEX: 37.03 KG/M2 | WEIGHT: 230.38 LBS | HEART RATE: 92 BPM | HEIGHT: 66 IN | SYSTOLIC BLOOD PRESSURE: 110 MMHG | DIASTOLIC BLOOD PRESSURE: 66 MMHG

## 2018-08-13 DIAGNOSIS — I10 ESSENTIAL HYPERTENSION: ICD-10-CM

## 2018-08-13 DIAGNOSIS — G47.33 OSA ON CPAP: ICD-10-CM

## 2018-08-13 DIAGNOSIS — F11.20 OPIOID DEPENDENCE ON AGONIST THERAPY: Primary | ICD-10-CM

## 2018-08-13 DIAGNOSIS — J44.9 COPD SUGGESTED BY INITIAL EVALUATION: ICD-10-CM

## 2018-08-13 LAB
CTP QC/QA: YES
POC 10 PANEL DRUG SCREEN: ABNORMAL

## 2018-08-13 PROCEDURE — 80305 DRUG TEST PRSMV DIR OPT OBS: CPT | Mod: QW,S$GLB,, | Performed by: FAMILY MEDICINE

## 2018-08-13 PROCEDURE — 99213 OFFICE O/P EST LOW 20 MIN: CPT | Mod: 25,S$GLB,, | Performed by: FAMILY MEDICINE

## 2018-08-13 PROCEDURE — 99999 PR PBB SHADOW E&M-EST. PATIENT-LVL III: CPT | Mod: PBBFAC,,, | Performed by: FAMILY MEDICINE

## 2018-08-13 PROCEDURE — 3008F BODY MASS INDEX DOCD: CPT | Mod: CPTII,S$GLB,, | Performed by: FAMILY MEDICINE

## 2018-08-13 PROCEDURE — 3078F DIAST BP <80 MM HG: CPT | Mod: CPTII,S$GLB,, | Performed by: FAMILY MEDICINE

## 2018-08-13 PROCEDURE — 3074F SYST BP LT 130 MM HG: CPT | Mod: CPTII,S$GLB,, | Performed by: FAMILY MEDICINE

## 2018-08-13 RX ORDER — LISINOPRIL 40 MG/1
40 TABLET ORAL DAILY
Qty: 30 TABLET | Refills: 5 | Status: ON HOLD | OUTPATIENT
Start: 2018-08-13 | End: 2019-01-01 | Stop reason: HOSPADM

## 2018-08-13 NOTE — PROGRESS NOTES
Subjective:       Patient ID: Donald Sharma is a 62 y.o. male.    Chief Complaint: Follow-up (4 week follow up)    The patient presents for medical management of opioid dependency. he is receiving maintenance therapy with Zubsolv.   Since his last visit he has lower the dose to 2.9 mg daily.  He would like to cut down to the next dose.  He was seen Dr. Galileo Fontenot; however, he passed away.  He became addicted to opiates after undergoing several back surgeries.  he is doing well and denies any craving or relapses. he denies any alcohol or substance abuse issues. he has had significant improvements in his relationships, social and occupational functioning. he claims to be compliant with treatment. I have reviewed his profile on the [facility] board of Pharmacy Prescription monitoring program website and he appears to be compliant.   The patient understands the risks, benefits and alternatives associated with the medical management of opioid dependency with Zubsolv and has signed a Behavior and Pain Agreement for the use of Controlled Drugs.  He is trying to lower his dose and wean off.  He is seeing a addiction counselor in [place] on a regular basis.  HE tolerates his blood pressure medication well and is not having side effects such as chronic cough or dizziness.          Review of Systems   Constitutional: Positive for activity change and unexpected weight change.   HENT: Negative for hearing loss, rhinorrhea and trouble swallowing.    Eyes: Negative for discharge and visual disturbance.   Respiratory: Positive for wheezing. Negative for chest tightness.    Cardiovascular: Negative for chest pain and palpitations.   Gastrointestinal: Negative for blood in stool, constipation, diarrhea and vomiting.   Endocrine: Negative for polydipsia and polyuria.   Genitourinary: Positive for difficulty urinating. Negative for hematuria and urgency.   Musculoskeletal: Positive for arthralgias. Negative for joint  swelling and neck pain.   Neurological: Negative for weakness and headaches.   Psychiatric/Behavioral: Negative for confusion and dysphoric mood.       Objective:      Vitals:    08/13/18 1305   BP: 110/66   Pulse: 92   Resp: 20     Physical Exam   Constitutional: He is oriented to person, place, and time. He appears well-developed and well-nourished.   HENT:   Head: Normocephalic and atraumatic.   Right Ear: Tympanic membrane, external ear and ear canal normal.   Left Ear: Tympanic membrane, external ear and ear canal normal.   Nose: Nose normal.   Mouth/Throat: Oropharynx is clear and moist.   Eyes: Conjunctivae and EOM are normal. Pupils are equal, round, and reactive to light.   Neck: Normal range of motion. Neck supple. No tracheal deviation present. No thyromegaly present.   Cardiovascular: Normal rate, regular rhythm, normal heart sounds and intact distal pulses. Exam reveals no gallop.   No murmur heard.  Pulmonary/Chest: Effort normal and breath sounds normal.   Abdominal: Soft. Bowel sounds are normal. He exhibits no distension and no mass. There is no tenderness. There is no rebound and no guarding. Hernia confirmed negative in the right inguinal area and confirmed negative in the left inguinal area.   Musculoskeletal: Normal range of motion. He exhibits no edema.   Neurological: He is alert and oriented to person, place, and time. He has normal reflexes.   Skin: Skin is warm and dry.   Psychiatric: He has a normal mood and affect. His behavior is normal. Judgment and thought content normal.         Urine drug screen was only positive for Subutex.      No results found for: LDLCALC  BMP  Lab Results   Component Value Date     06/05/2018    K 4.6 06/05/2018     06/05/2018    CO2 29 06/05/2018    BUN 8 06/05/2018    CREATININE 1.0 06/05/2018    CALCIUM 9.8 06/05/2018    ANIONGAP 8 06/05/2018    ESTGFRAFRICA >60.0 06/05/2018    EGFRNONAA >60.0 06/05/2018     Lab Results   Component Value Date     WBC 14.28 (H) 06/05/2018    HGB 11.4 (L) 06/05/2018    HCT 35.4 (L) 06/05/2018    MCV 96 06/05/2018     06/05/2018     Assessment:       1. Opioid dependence on agonist therapy    2. Essential hypertension    3. COPD suggested by initial evaluation    4. EMILIANO on CPAP        Plan:   Donald was seen today for establish care.    Diagnoses and all orders for this visit:    Opioid type dependence in remission  1.  Lower dose of Zubsolv 2.9 mg to 1.7 mg daily  2.  Patient is encouraged to continue therapy  3.  Patient will return to clinic in one month  4.  A urinary drug screen done today   5.  Patient encouraged to avoid places and friends that are associated with past drug  use.  6.  15 minutes was spent with patient.  At that time was counseling the patient about dependence, anxiety issues, and relationships  -     buprenorphine-naloxone (ZUBSOLV)  mg Subl; Place 1 tablet under the tongue once daily.  Note written stating he is on the Suboxone program and weaning.  This is for the [employer].    Benign non-nodular prostatic hyperplasia with lower urinary tract symptoms  -     tamsulosin (FLOMAX) 0.4 mg Cap; TAKE 1 CAPSULE (0.4 MG TOTAL) BY MOUTH ONCE DAILY.    Essential hypertension  Two gram sodium diet.    Weight loss discussed.    Try to walk 2 miles per day.    Avoid smoking.    Current medications will be:  -     lisinopril (PRINIVIL,ZESTRIL) 40 MG tablet; Take 1 tablet (40 mg total) by mouth once daily.    Opioid dependence on agonist therapy  -     buprenorphine-naloxone (ZUBSOLV) 1.4-0.36 mg Subl; Place 1 tablet under the tongue once daily. JOSE ELIAS # (Suboxone) [id number]  Dispense: 30 tablet; Refill: 0    Essential hypertension  -     lisinopril (PRINIVIL,ZESTRIL) 40 MG tablet; Take 1 tablet (40 mg total) by mouth once daily.  Dispense: 30 tablet; Refill: 5    COPD suggested by initial evaluation    EMILIANO on CPAP    RTC in 1 month to consider lowering dose to 0.7.

## 2018-08-21 DIAGNOSIS — J44.9 CHRONIC OBSTRUCTIVE PULMONARY DISEASE, UNSPECIFIED COPD TYPE: ICD-10-CM

## 2018-08-21 RX ORDER — TIOTROPIUM BROMIDE 18 UG/1
CAPSULE ORAL; RESPIRATORY (INHALATION)
Qty: 1 BOX | Refills: 11 | Status: SHIPPED | OUTPATIENT
Start: 2018-08-21 | End: 2018-09-10

## 2018-08-27 ENCOUNTER — HOSPITAL ENCOUNTER (OUTPATIENT)
Dept: CARDIOLOGY | Facility: CLINIC | Age: 63
Discharge: HOME | End: 2018-08-27
Attending: INTERNAL MEDICINE
Payer: COMMERCIAL

## 2018-08-27 DIAGNOSIS — J41.0 SIMPLE CHRONIC BRONCHITIS: ICD-10-CM

## 2018-08-27 LAB — DIASTOLIC DYSFUNCTION: NO

## 2018-08-27 PROCEDURE — 94621 CARDIOPULM EXERCISE TESTING: CPT | Mod: S$GLB,,, | Performed by: INTERNAL MEDICINE

## 2018-08-30 ENCOUNTER — TELEPHONE (OUTPATIENT)
Dept: PULMONOLOGY | Facility: CLINIC | Age: 63
End: 2018-08-30

## 2018-08-30 DIAGNOSIS — R06.09 EXERTIONAL DYSPNEA: ICD-10-CM

## 2018-08-30 DIAGNOSIS — J41.0 SIMPLE CHRONIC BRONCHITIS: Primary | ICD-10-CM

## 2018-08-30 NOTE — TELEPHONE ENCOUNTER
Pt informed that his repeat CPX study shows a severely reduced exercise capacity.  The etiology for this is not entirely clear.  He has COPD, but his respiratory reserve was not reduced in the exercise study.  I have asked him to have consult with cardiology to investigate non-pulmonary etiologies for his dyspnea.    He is currently not working because he apparently does not pass the [employer]'s medical standards (specific criteria not available to me).  I am declining to complete his disability forms since I do not have enough information regarding his job requirements.

## 2018-09-10 ENCOUNTER — OFFICE VISIT (OUTPATIENT)
Dept: FAMILY MEDICINE | Facility: CLINIC | Age: 63
End: 2018-09-10
Payer: COMMERCIAL

## 2018-09-10 VITALS
SYSTOLIC BLOOD PRESSURE: 90 MMHG | RESPIRATION RATE: 18 BRPM | WEIGHT: 227.63 LBS | BODY MASS INDEX: 36.58 KG/M2 | DIASTOLIC BLOOD PRESSURE: 62 MMHG | HEIGHT: 66 IN | HEART RATE: 76 BPM

## 2018-09-10 DIAGNOSIS — F11.20 OPIOID DEPENDENCE ON AGONIST THERAPY: Primary | ICD-10-CM

## 2018-09-10 PROCEDURE — 99213 OFFICE O/P EST LOW 20 MIN: CPT | Mod: S$GLB,,, | Performed by: FAMILY MEDICINE

## 2018-09-10 PROCEDURE — 3008F BODY MASS INDEX DOCD: CPT | Mod: CPTII,S$GLB,, | Performed by: FAMILY MEDICINE

## 2018-09-10 PROCEDURE — 3078F DIAST BP <80 MM HG: CPT | Mod: CPTII,S$GLB,, | Performed by: FAMILY MEDICINE

## 2018-09-10 PROCEDURE — 3074F SYST BP LT 130 MM HG: CPT | Mod: CPTII,S$GLB,, | Performed by: FAMILY MEDICINE

## 2018-09-10 PROCEDURE — 99999 PR PBB SHADOW E&M-EST. PATIENT-LVL III: CPT | Mod: PBBFAC,,, | Performed by: FAMILY MEDICINE

## 2018-09-10 RX ORDER — UMECLIDINIUM BROMIDE AND VILANTEROL TRIFENATATE 62.5; 25 UG/1; UG/1
POWDER RESPIRATORY (INHALATION)
Status: ON HOLD | COMMUNITY
Start: 2018-09-05 | End: 2019-01-01 | Stop reason: HOSPADM

## 2018-09-10 NOTE — PROGRESS NOTES
Subjective:       Patient ID: Donald Sharma is a 62 y.o. male.    Chief Complaint: Follow-up (4 week follow up)    The patient presents for medical management of opioid dependency. he is receiving maintenance therapy with Zubsolv.   Since his last visit he has lower the dose to 2.9 mg daily.  Now he is taking 1.7 mg daily.  He would like to cut down to the next dose.  He was seen Dr. Galileo Fontenot; however, he passed away.  He became addicted to opiates after undergoing several back surgeries.  he is doing well and denies any craving or relapses. he denies any alcohol or substance abuse issues. he has had significant improvements in his relationships, social and occupational functioning. he claims to be compliant with treatment. I have reviewed his profile on the [facility] board of Pharmacy Prescription monitoring program website and he appears to be compliant.   The patient understands the risks, benefits and alternatives associated with the medical management of opioid dependency with Zubsolv and has signed a Behavior and Pain Agreement for the use of Controlled Drugs.  He is trying to lower his dose and wean off.  He is seeing a addiction counselor in [place] on a regular basis.  HE tolerates his blood pressure medication well and is not having side effects such as chronic cough or dizziness.          Review of Systems   Constitutional: Positive for activity change and unexpected weight change.   HENT: Negative for hearing loss, rhinorrhea and trouble swallowing.    Eyes: Negative for discharge and visual disturbance.   Respiratory: Positive for wheezing. Negative for chest tightness.    Cardiovascular: Negative for chest pain and palpitations.   Gastrointestinal: Negative for blood in stool, constipation, diarrhea and vomiting.   Endocrine: Negative for polydipsia and polyuria.   Genitourinary: Positive for difficulty urinating. Negative for hematuria and urgency.   Musculoskeletal: Positive for  arthralgias. Negative for joint swelling and neck pain.   Neurological: Negative for weakness and headaches.   Psychiatric/Behavioral: Negative for confusion and dysphoric mood.       Objective:      Vitals:    09/10/18 1316   BP: 90/62   Pulse: 76   Resp: 18     Physical Exam   Constitutional: He is oriented to person, place, and time. He appears well-developed and well-nourished.   HENT:   Head: Normocephalic and atraumatic.   Right Ear: Tympanic membrane, external ear and ear canal normal.   Left Ear: Tympanic membrane, external ear and ear canal normal.   Nose: Nose normal.   Mouth/Throat: Oropharynx is clear and moist.   Eyes: Conjunctivae and EOM are normal. Pupils are equal, round, and reactive to light.   Neck: Normal range of motion. Neck supple. No tracheal deviation present. No thyromegaly present.   Cardiovascular: Normal rate, regular rhythm, normal heart sounds and intact distal pulses. Exam reveals no gallop.   No murmur heard.  Pulmonary/Chest: Effort normal and breath sounds normal.   Abdominal: Soft. Bowel sounds are normal. He exhibits no distension and no mass. There is no tenderness. There is no rebound and no guarding. Hernia confirmed negative in the right inguinal area and confirmed negative in the left inguinal area.   Musculoskeletal: Normal range of motion. He exhibits no edema.   Neurological: He is alert and oriented to person, place, and time. He has normal reflexes.   Skin: Skin is warm and dry.   Psychiatric: He has a normal mood and affect. His behavior is normal. Judgment and thought content normal.         Urine drug screen was only positive for Subutex.      No results found for: LDLCALC  BMP  Lab Results   Component Value Date     06/05/2018    K 4.6 06/05/2018     06/05/2018    CO2 29 06/05/2018    BUN 8 06/05/2018    CREATININE 1.0 06/05/2018    CALCIUM 9.8 06/05/2018    ANIONGAP 8 06/05/2018    ESTGFRAFRICA >60.0 06/05/2018    EGFRNONAA >60.0 06/05/2018     Lab  Results   Component Value Date    WBC 14.28 (H) 06/05/2018    HGB 11.4 (L) 06/05/2018    HCT 35.4 (L) 06/05/2018    MCV 96 06/05/2018     06/05/2018     Assessment:       1. Opioid dependence on agonist therapy        Plan:   Donald was seen today for establish care.    Diagnoses and all orders for this visit:    Opioid type dependence in remission  1.  Lower dose of Zubsolv 1.7 mg daily to 0.7 mg daily for 3 weeks then stop.  2.  Patient is encouraged to continue therapy  3.  Patient will return to clinic in one month  4.  A urinary drug screen done today   5.  Patient encouraged to avoid places and friends that are associated with past drug  use.  6.  15 minutes was spent with patient.  At that time was counseling the patient about dependence, anxiety issues, and relationships  -     buprenorphine-naloxone (ZUBSOLV)  mg Subl; Place 1 tablet under the tongue once daily.  Note written stating he is on the Suboxone program and weaning.  This is for [employer].    Benign non-nodular prostatic hyperplasia with lower urinary tract symptoms  -     tamsulosin (FLOMAX) 0.4 mg Cap; TAKE 1 CAPSULE (0.4 MG TOTAL) BY MOUTH ONCE DAILY.    Essential hypertension  Two gram sodium diet.    Weight loss discussed.    Try to walk 2 miles per day.    Avoid smoking.    Current medications will be:  -  Decrease   lisinopril (PRINIVIL,ZESTRIL) from 40 MG to 20 MG tablet; Take 1 tablet by mouth once daily.    Opioid dependence on agonist therapy  -     buprenorphine-naloxone (ZUBSOLV) 0.7-0.18 mg Subl; Place 0.7 mg under the tongue once daily. JOSE ELIAS # (Suboxone) [id number]  Dispense: 30 tablet; Refill: 0    RTC in 1 month.

## 2018-10-01 ENCOUNTER — OFFICE VISIT (OUTPATIENT)
Dept: FAMILY MEDICINE | Facility: CLINIC | Age: 63
End: 2018-10-01
Payer: COMMERCIAL

## 2018-10-01 VITALS
HEIGHT: 66 IN | HEART RATE: 88 BPM | DIASTOLIC BLOOD PRESSURE: 70 MMHG | SYSTOLIC BLOOD PRESSURE: 104 MMHG | BODY MASS INDEX: 35.68 KG/M2 | WEIGHT: 222 LBS | RESPIRATION RATE: 18 BRPM

## 2018-10-01 DIAGNOSIS — I10 ESSENTIAL HYPERTENSION: ICD-10-CM

## 2018-10-01 DIAGNOSIS — F11.20 OPIOID DEPENDENCE ON AGONIST THERAPY: Primary | ICD-10-CM

## 2018-10-01 PROCEDURE — 99213 OFFICE O/P EST LOW 20 MIN: CPT | Mod: S$GLB,,, | Performed by: FAMILY MEDICINE

## 2018-10-01 PROCEDURE — 3078F DIAST BP <80 MM HG: CPT | Mod: CPTII,S$GLB,, | Performed by: FAMILY MEDICINE

## 2018-10-01 PROCEDURE — 3074F SYST BP LT 130 MM HG: CPT | Mod: CPTII,S$GLB,, | Performed by: FAMILY MEDICINE

## 2018-10-01 PROCEDURE — 3008F BODY MASS INDEX DOCD: CPT | Mod: CPTII,S$GLB,, | Performed by: FAMILY MEDICINE

## 2018-10-01 PROCEDURE — 99999 PR PBB SHADOW E&M-EST. PATIENT-LVL III: CPT | Mod: PBBFAC,,, | Performed by: FAMILY MEDICINE

## 2018-10-01 NOTE — PROGRESS NOTES
Subjective:       Patient ID: Donald Sharma is a 62 y.o. male.    Chief Complaint: Follow-up (4 week follow up)    The patient presents for medical management of opioid dependency. he is receiving maintenance therapy with Zubsolv.   Since his last visit he has lowered the dose to 0.7 mg daily.  He is ready to stop completely.  Patient's blood pressure is better.  I lowered his lisinopril dose at his last visit.  He saw cardiologist and had a nuclear stress test and no evidence of coronary artery disease was detected.  He was seen Dr. Galileo Fontenot; however, he passed away.  He became addicted to opiates after undergoing several back surgeries.  he is doing well and denies any craving or relapses. he denies any alcohol or substance abuse issues. he has had significant improvements in his relationships, social and occupational functioning. he claims to be compliant with treatment. I have reviewed his profile on the [place] board of Pharmacy Prescription monitoring program website and he appears to be compliant.   The patient understands the risks, benefits and alternatives associated with the medical management of opioid dependency with Zubsolv and has signed a Behavior and Pain Agreement for the use of Controlled Drugs.  He is trying to lower his dose and wean off.  He is seeing a addiction counselor in [place] on a regular basis.  HE tolerates his blood pressure medication well and is not having side effects such as chronic cough or dizziness.          Review of Systems   Constitutional: Positive for activity change and unexpected weight change.   HENT: Negative for hearing loss, rhinorrhea and trouble swallowing.    Eyes: Negative for discharge and visual disturbance.   Respiratory: Positive for wheezing. Negative for chest tightness.    Cardiovascular: Negative for chest pain and palpitations.   Gastrointestinal: Negative for blood in stool, constipation, diarrhea and vomiting.   Endocrine:  Negative for polydipsia and polyuria.   Genitourinary: Positive for difficulty urinating. Negative for hematuria and urgency.   Musculoskeletal: Positive for arthralgias. Negative for joint swelling and neck pain.   Neurological: Negative for weakness and headaches.   Psychiatric/Behavioral: Negative for confusion and dysphoric mood.       Objective:      Vitals:    10/01/18 1226   BP: 104/70   Pulse: 88   Resp: 18     Physical Exam   Constitutional: He is oriented to person, place, and time. He appears well-developed and well-nourished.   HENT:   Head: Normocephalic and atraumatic.   Right Ear: Tympanic membrane, external ear and ear canal normal.   Left Ear: Tympanic membrane, external ear and ear canal normal.   Nose: Nose normal.   Mouth/Throat: Oropharynx is clear and moist.   Eyes: Conjunctivae and EOM are normal. Pupils are equal, round, and reactive to light.   Neck: Normal range of motion. Neck supple. No tracheal deviation present. No thyromegaly present.   Cardiovascular: Normal rate, regular rhythm, normal heart sounds and intact distal pulses. Exam reveals no gallop.   No murmur heard.  Pulmonary/Chest: Effort normal and breath sounds normal.   Abdominal: Soft. Bowel sounds are normal. He exhibits no distension and no mass. There is no tenderness. There is no rebound and no guarding. Hernia confirmed negative in the right inguinal area and confirmed negative in the left inguinal area.   Musculoskeletal: Normal range of motion. He exhibits no edema.   Neurological: He is alert and oriented to person, place, and time. He has normal reflexes.   Skin: Skin is warm and dry.   Psychiatric: He has a normal mood and affect. His behavior is normal. Judgment and thought content normal.         Urine drug screen was only positive for Subutex.      No results found for: LDLCALC  BMP  Lab Results   Component Value Date     06/05/2018    K 4.6 06/05/2018     06/05/2018    CO2 29 06/05/2018    BUN 8  06/05/2018    CREATININE 1.0 06/05/2018    CALCIUM 9.8 06/05/2018    ANIONGAP 8 06/05/2018    ESTGFRAFRICA >60.0 06/05/2018    EGFRNONAA >60.0 06/05/2018     Lab Results   Component Value Date    WBC 14.28 (H) 06/05/2018    HGB 11.4 (L) 06/05/2018    HCT 35.4 (L) 06/05/2018    MCV 96 06/05/2018     06/05/2018     Assessment:       1. Opioid dependence on agonist therapy    2. Essential hypertension        Plan:   Donald was seen today for establish care.    Diagnoses and all orders for this visit:    Opioid type dependence in remission  1.  Lower dose of Zubsolv 1.7 mg daily to 0.7 mg daily for 3 weeks then stop.  2.  Patient is encouraged to continue therapy  3.  Patient will return to clinic in one month  4.  A urinary drug screen done today   5.  Patient encouraged to avoid places and friends that are associated with past drug  use.  6.  15 minutes was spent with patient.  At that time was counseling the patient about dependence, anxiety issues, and relationships  -     buprenorphine-naloxone (ZUBSOLV)  mg Subl; Place 1 tablet under the tongue once daily.  Note written stating he is on the Suboxone program and weaning.  This is for the [employer].    Benign non-nodular prostatic hyperplasia with lower urinary tract symptoms  -     tamsulosin (FLOMAX) 0.4 mg Cap; TAKE 1 CAPSULE (0.4 MG TOTAL) BY MOUTH ONCE DAILY.    Essential hypertension  Two gram sodium diet.    Weight loss discussed.    Try to walk 2 miles per day.    Avoid smoking.    Current medications will be:  - lisinopril (PRINIVIL,ZESTRIL)  20 MG tablet; Take 1 tablet by mouth once daily.    Opioid dependence on agonist therapy    Essential hypertension    RTC in 1 month.  If he successfully weaned off Suboxone I will write a letter for the [employer] stating such.

## 2018-10-05 DIAGNOSIS — Z11.59 NEED FOR HEPATITIS C SCREENING TEST: ICD-10-CM

## 2018-10-15 ENCOUNTER — TELEPHONE (OUTPATIENT)
Dept: FAMILY MEDICINE | Facility: CLINIC | Age: 63
End: 2018-10-15

## 2018-10-15 NOTE — TELEPHONE ENCOUNTER
----- Message from Tricia Salgado sent at 10/15/2018 12:41 PM CDT -----  Contact: Self  Donald Sharma  MRN: [id number]  : 1955  PCP: Sai Kramer  Home Phone      [phone]  Work Phone      Not on file.  Mobile          [phone]      MESSAGE:   Patient states that he is trying to get he's medical card and he received a phone call that he will need additional information from [facility] stating that he has finished previous programs.   Please advise    Phone:  [phone]

## 2018-10-16 ENCOUNTER — OFFICE VISIT (OUTPATIENT)
Dept: FAMILY MEDICINE | Facility: CLINIC | Age: 63
End: 2018-10-16
Payer: COMMERCIAL

## 2018-10-16 VITALS
RESPIRATION RATE: 17 BRPM | SYSTOLIC BLOOD PRESSURE: 122 MMHG | HEART RATE: 68 BPM | HEIGHT: 67 IN | BODY MASS INDEX: 35.19 KG/M2 | DIASTOLIC BLOOD PRESSURE: 80 MMHG | WEIGHT: 224.19 LBS

## 2018-10-16 DIAGNOSIS — F11.20 OPIOID DEPENDENCE ON AGONIST THERAPY: Primary | ICD-10-CM

## 2018-10-16 PROCEDURE — 99213 OFFICE O/P EST LOW 20 MIN: CPT | Mod: S$GLB,,, | Performed by: FAMILY MEDICINE

## 2018-10-16 PROCEDURE — 99999 PR PBB SHADOW E&M-EST. PATIENT-LVL III: CPT | Mod: PBBFAC,,, | Performed by: FAMILY MEDICINE

## 2018-10-16 PROCEDURE — 3008F BODY MASS INDEX DOCD: CPT | Mod: CPTII,S$GLB,, | Performed by: FAMILY MEDICINE

## 2018-10-16 PROCEDURE — 3079F DIAST BP 80-89 MM HG: CPT | Mod: CPTII,S$GLB,, | Performed by: FAMILY MEDICINE

## 2018-10-16 PROCEDURE — 3074F SYST BP LT 130 MM HG: CPT | Mod: CPTII,S$GLB,, | Performed by: FAMILY MEDICINE

## 2018-10-16 NOTE — PROGRESS NOTES
Subjective:       Patient ID: Donald Sharma is a 62 y.o. male.    Chief Complaint: discuss meds and letter for [facility]    The patient presents for medical management of opioid dependency.  He became addicted to opiates after painful back surgery.  He has never had problems with addiction before this.  He was placed on Suboxone because he had severe withdrawal symptoms when he stopped taking opiates.  He was receiving maintenance therapy with Zubsolv.   Since his last visit he has lowered the dose to 0.7 mg daily.  He is has weaned off completely and has not taken anything for 8 days.  He is not having any withdrawal symptoms at all.  He needs a letter for the [employer] stating he is no longer taking Zubsolv.  Patient's blood pressure is better.  I lowered his lisinopril dose at his last visit.  He saw cardiologist and had a nuclear stress test and no evidence of coronary artery disease was detected.  He was seen Dr. Galileo Fontenot; however, he passed away.  He became addicted to opiates after undergoing several back surgeries.  he is doing well and denies any craving or relapses. he denies any alcohol or substance abuse issues. he has had significant improvements in his relationships, social and occupational functioning. he claims to be compliant with treatment. I have reviewed his profile on the [facility] board of Pharmacy Prescription monitoring program website and he appears to be compliant.   The patient understands the risks, benefits and alternatives associated with the medical management of opioid dependency with Zubsolv and has signed a Behavior and Pain Agreement for the use of Controlled Drugs.  He is trying to lower his dose and wean off.  He is seeing a addiction counselor in [place] on a regular basis.  HE tolerates his blood pressure medication well and is not having side effects such as chronic cough or dizziness.           Review of Systems   Constitutional: Positive for  activity change and unexpected weight change.   HENT: Negative for hearing loss, rhinorrhea and trouble swallowing.    Eyes: Negative for discharge and visual disturbance.   Respiratory: Positive for wheezing. Negative for chest tightness.    Cardiovascular: Negative for chest pain and palpitations.   Gastrointestinal: Negative for blood in stool, constipation, diarrhea and vomiting.   Endocrine: Negative for polydipsia and polyuria.   Genitourinary: Positive for difficulty urinating. Negative for hematuria and urgency.   Musculoskeletal: Positive for arthralgias. Negative for joint swelling and neck pain.   Neurological: Negative for weakness and headaches.   Psychiatric/Behavioral: Negative for confusion and dysphoric mood.       Objective:      Vitals:    10/16/18 0927   BP: 122/80   Pulse: 68   Resp: 17     Physical Exam   Constitutional: He is oriented to person, place, and time. He appears well-developed and well-nourished.   HENT:   Head: Normocephalic and atraumatic.   Right Ear: Tympanic membrane, external ear and ear canal normal.   Left Ear: Tympanic membrane, external ear and ear canal normal.   Nose: Nose normal.   Mouth/Throat: Oropharynx is clear and moist.   Eyes: Conjunctivae and EOM are normal. Pupils are equal, round, and reactive to light.   Neck: Normal range of motion. Neck supple. No tracheal deviation present. No thyromegaly present.   Cardiovascular: Normal rate, regular rhythm, normal heart sounds and intact distal pulses. Exam reveals no gallop.   No murmur heard.  Pulmonary/Chest: Effort normal and breath sounds normal.   Abdominal: Soft. Bowel sounds are normal. He exhibits no distension and no mass. There is no tenderness. There is no rebound and no guarding. Hernia confirmed negative in the right inguinal area and confirmed negative in the left inguinal area.   Musculoskeletal: Normal range of motion. He exhibits no edema.   Neurological: He is alert and oriented to person, place, and  time. He has normal reflexes.   Skin: Skin is warm and dry.   Psychiatric: He has a normal mood and affect. His behavior is normal. Judgment and thought content normal.         Urine drug screen was only positive for Subutex.      No results found for: LDLCALC  BMP  Lab Results   Component Value Date     06/05/2018    K 4.6 06/05/2018     06/05/2018    CO2 29 06/05/2018    BUN 8 06/05/2018    CREATININE 1.0 06/05/2018    CALCIUM 9.8 06/05/2018    ANIONGAP 8 06/05/2018    ESTGFRAFRICA >60.0 06/05/2018    EGFRNONAA >60.0 06/05/2018     Lab Results   Component Value Date    WBC 14.28 (H) 06/05/2018    HGB 11.4 (L) 06/05/2018    HCT 35.4 (L) 06/05/2018    MCV 96 06/05/2018     06/05/2018     Assessment:       1. Opioid dependence on agonist therapy        Plan:   Donald was seen today for establish care.    Diagnoses and all orders for this visit:    Opioid type dependence in remission  1.   Zubsolv is discontinued.  He has successfully weaned off  Note written stating he is no longer on the Suboxone program.  He has successfully weaned off.  This is for the [employer].    Benign non-nodular prostatic hyperplasia with lower urinary tract symptoms  -     tamsulosin (FLOMAX) 0.4 mg Cap; TAKE 1 CAPSULE (0.4 MG TOTAL) BY MOUTH ONCE DAILY.    Essential hypertension  Two gram sodium diet.    Weight loss discussed.    Try to walk 2 miles per day.    Avoid smoking.    Current medications will be:  - lisinopril (PRINIVIL,ZESTRIL)  20 MG tablet; Take 1 tablet by mouth once daily.    Opioid dependence on agonist therapy    RTC in 6 month.  He has successfully weaned off Suboxone I will write a letter for [employer] stating such.

## 2019-01-01 ENCOUNTER — HOSPITAL ENCOUNTER (EMERGENCY)
Facility: HOSPITAL | Age: 64
Discharge: HOME | End: 2019-10-08
Attending: SURGERY
Payer: COMMERCIAL

## 2019-01-01 VITALS
WEIGHT: 229.75 LBS | OXYGEN SATURATION: 95 % | HEART RATE: 74 BPM | TEMPERATURE: 96 F | RESPIRATION RATE: 20 BRPM | HEIGHT: 67 IN | DIASTOLIC BLOOD PRESSURE: 56 MMHG | SYSTOLIC BLOOD PRESSURE: 123 MMHG | BODY MASS INDEX: 36.06 KG/M2

## 2019-01-01 DIAGNOSIS — I50.9 CONGESTIVE HEART FAILURE, UNSPECIFIED HF CHRONICITY, UNSPECIFIED HEART FAILURE TYPE: Primary | ICD-10-CM

## 2019-01-01 DIAGNOSIS — Z12.11 COLON CANCER SCREENING: ICD-10-CM

## 2019-01-01 DIAGNOSIS — R06.00 DYSPNEA: ICD-10-CM

## 2019-01-01 LAB
ALBUMIN SERPL BCP-MCNC: 3.7 G/DL (ref 3.5–5.2)
ALP SERPL-CCNC: 79 U/L (ref 55–135)
ALT SERPL W/O P-5'-P-CCNC: 68 U/L (ref 10–44)
ANION GAP SERPL CALC-SCNC: 8 MMOL/L (ref 8–16)
AST SERPL-CCNC: 45 U/L (ref 10–40)
BASOPHILS # BLD AUTO: 0.06 K/UL (ref 0–0.2)
BASOPHILS NFR BLD: 0.6 % (ref 0–1.9)
BILIRUB SERPL-MCNC: 1.3 MG/DL (ref 0.1–1)
BNP SERPL-MCNC: 982 PG/ML (ref 0–99)
BUN SERPL-MCNC: 13 MG/DL (ref 8–23)
CALCIUM SERPL-MCNC: 9.1 MG/DL (ref 8.7–10.5)
CHLORIDE SERPL-SCNC: 96 MMOL/L (ref 95–110)
CK MB SERPL-MCNC: 4.1 NG/ML (ref 0.1–6.5)
CK MB SERPL-RTO: 1.4 % (ref 0–5)
CK SERPL-CCNC: 301 U/L (ref 20–200)
CO2 SERPL-SCNC: 25 MMOL/L (ref 23–29)
CREAT SERPL-MCNC: 1 MG/DL (ref 0.5–1.4)
DIFFERENTIAL METHOD: ABNORMAL
EOSINOPHIL # BLD AUTO: 0.1 K/UL (ref 0–0.5)
EOSINOPHIL NFR BLD: 1 % (ref 0–8)
ERYTHROCYTE [DISTWIDTH] IN BLOOD BY AUTOMATED COUNT: 13.1 % (ref 11.5–14.5)
EST. GFR  (AFRICAN AMERICAN): >60 ML/MIN/1.73 M^2
EST. GFR  (NON AFRICAN AMERICAN): >60 ML/MIN/1.73 M^2
GLUCOSE SERPL-MCNC: 107 MG/DL (ref 70–110)
HCT VFR BLD AUTO: 33.4 % (ref 40–54)
HGB BLD-MCNC: 11 G/DL (ref 14–18)
IMM GRANULOCYTES # BLD AUTO: 0.06 K/UL (ref 0–0.04)
IMM GRANULOCYTES NFR BLD AUTO: 0.6 % (ref 0–0.5)
LYMPHOCYTES # BLD AUTO: 2.2 K/UL (ref 1–4.8)
LYMPHOCYTES NFR BLD: 22 % (ref 18–48)
MCH RBC QN AUTO: 31.3 PG (ref 27–31)
MCHC RBC AUTO-ENTMCNC: 32.9 G/DL (ref 32–36)
MCV RBC AUTO: 95 FL (ref 82–98)
MONOCYTES # BLD AUTO: 0.9 K/UL (ref 0.3–1)
MONOCYTES NFR BLD: 8.8 % (ref 4–15)
NEUTROPHILS # BLD AUTO: 6.7 K/UL (ref 1.8–7.7)
NEUTROPHILS NFR BLD: 67 % (ref 38–73)
NRBC BLD-RTO: 0 /100 WBC
PLATELET # BLD AUTO: 227 K/UL (ref 150–350)
PMV BLD AUTO: 9.3 FL (ref 9.2–12.9)
POTASSIUM SERPL-SCNC: 4.6 MMOL/L (ref 3.5–5.1)
PROT SERPL-MCNC: 7.3 G/DL (ref 6–8.4)
RBC # BLD AUTO: 3.52 M/UL (ref 4.6–6.2)
SODIUM SERPL-SCNC: 129 MMOL/L (ref 136–145)
TROPONIN I SERPL DL<=0.01 NG/ML-MCNC: 0.01 NG/ML (ref 0–0.03)
WBC # BLD AUTO: 9.92 K/UL (ref 3.9–12.7)

## 2019-01-01 PROCEDURE — 82553 CREATINE MB FRACTION: CPT

## 2019-01-01 PROCEDURE — 99285 EMERGENCY DEPT VISIT HI MDM: CPT | Mod: 25

## 2019-01-01 PROCEDURE — 82550 ASSAY OF CK (CPK): CPT

## 2019-01-01 PROCEDURE — 25000242 PHARM REV CODE 250 ALT 637 W/ HCPCS: Performed by: SURGERY

## 2019-01-01 PROCEDURE — 85025 COMPLETE CBC W/AUTO DIFF WBC: CPT

## 2019-01-01 PROCEDURE — 93010 EKG 12-LEAD: ICD-10-PCS | Mod: ,,, | Performed by: INTERNAL MEDICINE

## 2019-01-01 PROCEDURE — 96372 THER/PROPH/DIAG INJ SC/IM: CPT

## 2019-01-01 PROCEDURE — 94640 AIRWAY INHALATION TREATMENT: CPT

## 2019-01-01 PROCEDURE — 80053 COMPREHEN METABOLIC PANEL: CPT

## 2019-01-01 PROCEDURE — 36415 COLL VENOUS BLD VENIPUNCTURE: CPT

## 2019-01-01 PROCEDURE — 83880 ASSAY OF NATRIURETIC PEPTIDE: CPT

## 2019-01-01 PROCEDURE — 84484 ASSAY OF TROPONIN QUANT: CPT

## 2019-01-01 PROCEDURE — 93010 ELECTROCARDIOGRAM REPORT: CPT | Mod: ,,, | Performed by: INTERNAL MEDICINE

## 2019-01-01 PROCEDURE — 63600175 PHARM REV CODE 636 W HCPCS: Performed by: SURGERY

## 2019-01-01 PROCEDURE — 93005 ELECTROCARDIOGRAM TRACING: CPT

## 2019-01-01 RX ORDER — ALBUTEROL SULFATE 0.63 MG/3ML
0.63 SOLUTION RESPIRATORY (INHALATION) EVERY 6 HOURS PRN
Qty: 20 VIAL | Refills: 0 | Status: ON HOLD | OUTPATIENT
Start: 2019-01-01 | End: 2019-01-01 | Stop reason: HOSPADM

## 2019-01-01 RX ORDER — IPRATROPIUM BROMIDE AND ALBUTEROL SULFATE 2.5; .5 MG/3ML; MG/3ML
3 SOLUTION RESPIRATORY (INHALATION)
Status: COMPLETED | OUTPATIENT
Start: 2019-01-01 | End: 2019-01-01

## 2019-01-01 RX ORDER — METHYLPREDNISOLONE SOD SUCC 125 MG
125 VIAL (EA) INJECTION
Status: COMPLETED | OUTPATIENT
Start: 2019-01-01 | End: 2019-01-01

## 2019-01-01 RX ORDER — FUROSEMIDE 20 MG/1
20 TABLET ORAL DAILY
Qty: 30 TABLET | Refills: 0 | Status: ON HOLD | OUTPATIENT
Start: 2019-01-01 | End: 2019-01-01 | Stop reason: HOSPADM

## 2019-01-01 RX ORDER — FUROSEMIDE 10 MG/ML
40 INJECTION INTRAMUSCULAR; INTRAVENOUS
Status: COMPLETED | OUTPATIENT
Start: 2019-01-01 | End: 2019-01-01

## 2019-01-01 RX ADMIN — METHYLPREDNISOLONE SODIUM SUCCINATE 125 MG: 125 INJECTION, POWDER, FOR SOLUTION INTRAMUSCULAR; INTRAVENOUS at 04:10

## 2019-01-01 RX ADMIN — IPRATROPIUM BROMIDE AND ALBUTEROL SULFATE 3 ML: .5; 3 SOLUTION RESPIRATORY (INHALATION) at 04:10

## 2019-01-01 RX ADMIN — IPRATROPIUM BROMIDE AND ALBUTEROL SULFATE 3 ML: .5; 3 SOLUTION RESPIRATORY (INHALATION) at 03:10

## 2019-01-01 RX ADMIN — FUROSEMIDE 40 MG: 10 INJECTION, SOLUTION INTRAMUSCULAR; INTRAVENOUS at 04:10

## 2019-10-08 NOTE — ED TRIAGE NOTES
63 y.o. male presents to ER qTrack 01/qTrk 01   Chief Complaint   Patient presents with    Cough   Pt reports cough and MASTERS for 4 days. Reports swelling to bilateral legs and abdomen. No acute distress noted.

## 2019-10-08 NOTE — ED PROVIDER NOTES
Encounter Date: 10/8/2019       History     Chief Complaint   Patient presents with    Cough     Patient is 63-year-old male with history of COPD, had quit smoking but is now smoking again.  He has had shortness of breath for the last 3 days.  Has a chronic cough with minimal productive sputum.  Also has noted significant peripheral edema in both legs, denies any history of congestive heart failure.        Review of patient's allergies indicates:   Allergen Reactions    No known drug allergies      Past Medical History:   Diagnosis Date    COPD (chronic obstructive pulmonary disease)     Degenerative disc disease     Lumbar     Opioid dependence     EMILIANO (obstructive sleep apnea)      Past Surgical History:   Procedure Laterality Date    APPENDECTOMY      THREE Epidural injections       Family History   Problem Relation Age of Onset    Heart attack Father     Heart disease Sister      Social History     Tobacco Use    Smoking status: Former Smoker     Packs/day: 1.00     Years: 38.00     Pack years: 38.00     Types: Vaping with nicotine     Last attempt to quit: 10/5/2017     Years since quittin.0    Smokeless tobacco: Never Used   Substance Use Topics    Alcohol use: No    Drug use: No     Review of Systems   Constitutional: Negative for fever.   HENT: Negative for sore throat.    Respiratory: Positive for cough. Negative for shortness of breath.    Cardiovascular: Positive for leg swelling. Negative for chest pain.   Gastrointestinal: Negative for nausea.   Genitourinary: Negative for dysuria.   Musculoskeletal: Negative for back pain.   Skin: Negative for rash.   Neurological: Negative for weakness.   Hematological: Does not bruise/bleed easily.       Physical Exam     Initial Vitals [10/08/19 1505]   BP Pulse Resp Temp SpO2   (!) 163/74 107 20 96.9 °F (36.1 °C) (!) 94 %      MAP       --         Physical Exam    Nursing note and vitals reviewed.  Constitutional: He appears well-developed and  well-nourished.   HENT:   Head: Normocephalic and atraumatic.   Eyes: EOM are normal. Pupils are equal, round, and reactive to light.   Neck: Normal range of motion. Neck supple.   Cardiovascular: Normal rate.   3+ pitting edema bilaterally   Pulmonary/Chest: No respiratory distress. He has wheezes.   Abdominal: Soft. He exhibits no distension. There is no tenderness.   Musculoskeletal: Normal range of motion.   Neurological: He is alert and oriented to person, place, and time.   Skin: Skin is warm and dry.   Psychiatric: He has a normal mood and affect. Thought content normal.         ED Course   Procedures  Labs Reviewed - No data to display       Imaging Results    None                               Clinical Impression:       ICD-10-CM ICD-9-CM   1. Congestive heart failure, unspecified HF chronicity, unspecified heart failure type I50.9 428.0   2. Dyspnea R06.00 786.09         Disposition:   Disposition: Discharged  Condition: Stable                        Shravan Cadena Jr., MD  10/08/19 1959

## 2019-11-17 PROBLEM — I50.9 ACUTE CONGESTIVE HEART FAILURE: Status: ACTIVE | Noted: 2019-01-01

## 2019-11-17 PROBLEM — I50.33 ACUTE ON CHRONIC DIASTOLIC HEART FAILURE: Status: ACTIVE | Noted: 2019-01-01

## 2019-11-17 PROBLEM — I47.29 NSVT (NONSUSTAINED VENTRICULAR TACHYCARDIA): Status: ACTIVE | Noted: 2019-01-01

## 2019-11-18 PROBLEM — J96.01 ACUTE RESPIRATORY FAILURE WITH HYPOXIA AND HYPERCARBIA: Status: ACTIVE | Noted: 2019-01-01

## 2019-11-18 PROBLEM — I46.9 CARDIAC ARREST: Status: ACTIVE | Noted: 2019-01-01

## 2019-11-18 PROBLEM — E87.1 HYPONATREMIA: Status: ACTIVE | Noted: 2019-01-01

## 2019-11-18 PROBLEM — E87.5 HYPERKALEMIA: Status: ACTIVE | Noted: 2019-01-01

## 2019-11-18 PROBLEM — N17.9 AKI (ACUTE KIDNEY INJURY): Status: ACTIVE | Noted: 2019-01-01

## 2019-11-18 PROBLEM — J96.02 ACUTE RESPIRATORY FAILURE WITH HYPOXIA AND HYPERCARBIA: Status: ACTIVE | Noted: 2019-01-01

## 2019-11-18 PROBLEM — K72.00 SHOCK LIVER: Status: ACTIVE | Noted: 2019-01-01

## 2019-11-18 PROBLEM — J96.01 ACUTE RESPIRATORY FAILURE WITH HYPOXIA: Status: ACTIVE | Noted: 2019-01-01

## 2019-11-19 PROBLEM — N18.9 ACUTE KIDNEY INJURY SUPERIMPOSED ON CHRONIC KIDNEY DISEASE: Status: ACTIVE | Noted: 2019-01-01

## 2019-11-19 PROBLEM — N17.9 ACUTE KIDNEY INJURY SUPERIMPOSED ON CHRONIC KIDNEY DISEASE: Status: ACTIVE | Noted: 2019-01-01

## 2019-11-19 PROBLEM — I47.29 NSVT (NONSUSTAINED VENTRICULAR TACHYCARDIA): Status: RESOLVED | Noted: 2019-01-01 | Resolved: 2019-01-01

## 2019-11-20 PROBLEM — E63.9 INADEQUATE DIETARY ENERGY INTAKE: Status: ACTIVE | Noted: 2019-01-01

## 2019-11-20 PROBLEM — E87.5 HYPERKALEMIA: Status: RESOLVED | Noted: 2019-01-01 | Resolved: 2019-01-01
